# Patient Record
Sex: FEMALE | Race: WHITE | NOT HISPANIC OR LATINO | URBAN - METROPOLITAN AREA
[De-identification: names, ages, dates, MRNs, and addresses within clinical notes are randomized per-mention and may not be internally consistent; named-entity substitution may affect disease eponyms.]

---

## 2017-07-12 ENCOUNTER — EMERGENCY (EMERGENCY)
Facility: HOSPITAL | Age: 52
LOS: 1 days | Discharge: HOME | End: 2017-07-12
Attending: EMERGENCY MEDICINE | Admitting: EMERGENCY MEDICINE
Payer: MEDICAID

## 2017-07-12 VITALS
SYSTOLIC BLOOD PRESSURE: 161 MMHG | TEMPERATURE: 98 F | RESPIRATION RATE: 18 BRPM | OXYGEN SATURATION: 100 % | DIASTOLIC BLOOD PRESSURE: 104 MMHG | HEART RATE: 110 BPM

## 2017-07-12 VITALS
RESPIRATION RATE: 16 BRPM | DIASTOLIC BLOOD PRESSURE: 90 MMHG | SYSTOLIC BLOOD PRESSURE: 143 MMHG | HEART RATE: 68 BPM | OXYGEN SATURATION: 99 %

## 2017-07-12 LAB
ALBUMIN SERPL ELPH-MCNC: 4.4 G/DL — SIGNIFICANT CHANGE UP (ref 3.3–5)
ALP SERPL-CCNC: 89 U/L — SIGNIFICANT CHANGE UP (ref 40–120)
ALT FLD-CCNC: 22 U/L — SIGNIFICANT CHANGE UP (ref 4–33)
AST SERPL-CCNC: 21 U/L — SIGNIFICANT CHANGE UP (ref 4–32)
BASOPHILS # BLD AUTO: 0.02 K/UL — SIGNIFICANT CHANGE UP (ref 0–0.2)
BASOPHILS NFR BLD AUTO: 0.2 % — SIGNIFICANT CHANGE UP (ref 0–2)
BILIRUB SERPL-MCNC: 0.3 MG/DL — SIGNIFICANT CHANGE UP (ref 0.2–1.2)
BUN SERPL-MCNC: 8 MG/DL — SIGNIFICANT CHANGE UP (ref 7–23)
CALCIUM SERPL-MCNC: 9.2 MG/DL — SIGNIFICANT CHANGE UP (ref 8.4–10.5)
CHLORIDE SERPL-SCNC: 100 MMOL/L — SIGNIFICANT CHANGE UP (ref 98–107)
CO2 SERPL-SCNC: 25 MMOL/L — SIGNIFICANT CHANGE UP (ref 22–31)
CREAT SERPL-MCNC: 0.91 MG/DL — SIGNIFICANT CHANGE UP (ref 0.5–1.3)
EOSINOPHIL # BLD AUTO: 0.09 K/UL — SIGNIFICANT CHANGE UP (ref 0–0.5)
EOSINOPHIL NFR BLD AUTO: 0.8 % — SIGNIFICANT CHANGE UP (ref 0–6)
GLUCOSE SERPL-MCNC: 103 MG/DL — HIGH (ref 70–99)
HCT VFR BLD CALC: 34.9 % — SIGNIFICANT CHANGE UP (ref 34.5–45)
HGB BLD-MCNC: 10.8 G/DL — LOW (ref 11.5–15.5)
IMM GRANULOCYTES # BLD AUTO: 0.06 # — SIGNIFICANT CHANGE UP
IMM GRANULOCYTES NFR BLD AUTO: 0.5 % — SIGNIFICANT CHANGE UP (ref 0–1.5)
LYMPHOCYTES # BLD AUTO: 1.18 K/UL — SIGNIFICANT CHANGE UP (ref 1–3.3)
LYMPHOCYTES # BLD AUTO: 10.8 % — LOW (ref 13–44)
MCHC RBC-ENTMCNC: 25 PG — LOW (ref 27–34)
MCHC RBC-ENTMCNC: 30.9 % — LOW (ref 32–36)
MCV RBC AUTO: 80.8 FL — SIGNIFICANT CHANGE UP (ref 80–100)
MONOCYTES # BLD AUTO: 0.76 K/UL — SIGNIFICANT CHANGE UP (ref 0–0.9)
MONOCYTES NFR BLD AUTO: 6.9 % — SIGNIFICANT CHANGE UP (ref 2–14)
NEUTROPHILS # BLD AUTO: 8.84 K/UL — HIGH (ref 1.8–7.4)
NEUTROPHILS NFR BLD AUTO: 80.8 % — HIGH (ref 43–77)
NRBC # FLD: 0 — SIGNIFICANT CHANGE UP
PLATELET # BLD AUTO: 511 K/UL — HIGH (ref 150–400)
PMV BLD: 10.3 FL — SIGNIFICANT CHANGE UP (ref 7–13)
POTASSIUM SERPL-MCNC: 3.6 MMOL/L — SIGNIFICANT CHANGE UP (ref 3.5–5.3)
POTASSIUM SERPL-SCNC: 3.6 MMOL/L — SIGNIFICANT CHANGE UP (ref 3.5–5.3)
PROT SERPL-MCNC: 8.2 G/DL — SIGNIFICANT CHANGE UP (ref 6–8.3)
RBC # BLD: 4.32 M/UL — SIGNIFICANT CHANGE UP (ref 3.8–5.2)
RBC # FLD: 15.9 % — HIGH (ref 10.3–14.5)
SODIUM SERPL-SCNC: 141 MMOL/L — SIGNIFICANT CHANGE UP (ref 135–145)
WBC # BLD: 10.95 K/UL — HIGH (ref 3.8–10.5)
WBC # FLD AUTO: 10.95 K/UL — HIGH (ref 3.8–10.5)

## 2017-07-12 PROCEDURE — 70450 CT HEAD/BRAIN W/O DYE: CPT | Mod: 26

## 2017-07-12 PROCEDURE — 99284 EMERGENCY DEPT VISIT MOD MDM: CPT

## 2017-07-12 NOTE — ED PROVIDER NOTE - OBJECTIVE STATEMENT
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 Pain typically occurs when pt lies down and turns head to side and lasts ~2hrs, resolves with tylenol. 8/10 severity, sharp and burning quality. Pt has never had a similar headache before. Sometimes pain is a/w nausea and dizziness. Today it is not associated with these symptoms. Pt currently sitting up and asymptomatic. No focal motor or sensory deficits. No fever. 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 Pain typically occurs when pt lies down and turns head to side and lasts ~2hrs, resolves with tylenol. 8/10 severity, sharp and burning quality. Pt has never had a similar headache before. Sometimes pain is a/w nausea and dizziness. Today it is not associated with these symptoms. Pt currently sitting up and asymptomatic. No focal motor or sensory deficits. No fever.    Attendinyo female presents with headache intermittently for 3 days.  States it was a sharp pain on the side of the head into the eye.  Pt has an AVM which was being monitored by neurology but she has not followed up with her neurologist in years.  No fever. no neck stiffness.

## 2017-07-12 NOTE — ED ADULT TRIAGE NOTE - CHIEF COMPLAINT QUOTE
Pt c/o HA & dizziness x 3 days accompanied by nausea. Hx: Migraines, AVM of L side. Denies vision changes, vomiting, fever/chills, palpitations, CP.

## 2017-07-12 NOTE — ED PROVIDER NOTE - MEDICAL DECISION MAKING DETAILS
ha, intermittent, no neuro findings. However pt lost to followup and has hx AVM. Will obtain CTH to r/o bleed and give pt neuro f/up.

## 2017-07-12 NOTE — ED PROVIDER NOTE - NEUROLOGICAL, MLM
Alert and oriented, no focal deficits, no motor or sensory deficits. CN 2-12 intact. no nystagmus or double vision, finger to nose intact

## 2017-07-12 NOTE — ED PROVIDER NOTE - CARE PLAN
Principal Discharge DX:	Headache  Instructions for follow-up, activity and diet:	The patient was given verbal and written discharge instructions. Specifically, instructions when to return to the ED and when to seek follow-up from their pcp was discussed. Any specialty follow-up was discussed, including how to make an appointment.  Instructions were discussed in simple, plain language and was understood by the patient. The patient understands that should their symptoms worsen or any new symptoms arise, they should return to the ED immediately for further evaluation.

## 2017-07-12 NOTE — ED ADULT NURSE NOTE - OBJECTIVE STATEMENT
pt on bed aox4 ambulatory c.o left sided headache(frontal/parietal/temporal area) for 3 days continous, with dizziness, nausea(room is like spinning)reports sometimes relieve with Tylenol,took it prior coming here that's why his Ha is not bad denies photosensitivity, vomiting CP fever chills palpitation weakness numbness tingling sensation on 4 extremity. 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

## 2020-06-02 ENCOUNTER — APPOINTMENT (OUTPATIENT)
Dept: DISASTER EMERGENCY | Facility: CLINIC | Age: 55
End: 2020-06-02

## 2020-06-02 DIAGNOSIS — Z01.818 ENCOUNTER FOR OTHER PREPROCEDURAL EXAMINATION: ICD-10-CM

## 2020-06-02 PROBLEM — Z00.00 ENCOUNTER FOR PREVENTIVE HEALTH EXAMINATION: Status: ACTIVE | Noted: 2020-06-02

## 2020-06-03 ENCOUNTER — TRANSCRIPTION ENCOUNTER (OUTPATIENT)
Age: 55
End: 2020-06-03

## 2020-06-03 LAB — SARS-COV-2 N GENE NPH QL NAA+PROBE: NOT DETECTED

## 2020-12-10 ENCOUNTER — APPOINTMENT (OUTPATIENT)
Dept: PHYSICAL MEDICINE AND REHAB | Facility: CLINIC | Age: 55
End: 2020-12-10
Payer: MEDICAID

## 2020-12-10 VITALS — DIASTOLIC BLOOD PRESSURE: 82 MMHG | SYSTOLIC BLOOD PRESSURE: 118 MMHG | HEART RATE: 90 BPM

## 2020-12-10 DIAGNOSIS — M77.30 CALCANEAL SPUR, UNSPECIFIED FOOT: ICD-10-CM

## 2020-12-10 DIAGNOSIS — Z85.43 PERSONAL HISTORY OF MALIGNANT NEOPLASM OF OVARY: ICD-10-CM

## 2020-12-10 PROBLEM — Z00.00 ENCOUNTER FOR PREVENTIVE HEALTH EXAMINATION: Noted: 2020-12-10

## 2020-12-10 PROCEDURE — 99204 OFFICE O/P NEW MOD 45 MIN: CPT

## 2020-12-10 PROCEDURE — 99072 ADDL SUPL MATRL&STAF TM PHE: CPT

## 2020-12-10 RX ORDER — AMLODIPINE BESYLATE 5 MG/1
TABLET ORAL
Refills: 0 | Status: ACTIVE | COMMUNITY

## 2020-12-10 RX ORDER — RIVAROXABAN 2.5 MG/1
TABLET, FILM COATED ORAL
Refills: 0 | Status: ACTIVE | COMMUNITY

## 2020-12-10 RX ORDER — DICLOFENAC SODIUM 1 %
1 KIT TOPICAL DAILY
Qty: 1 | Refills: 2 | Status: ACTIVE | COMMUNITY
Start: 2020-12-10 | End: 1900-01-01

## 2020-12-10 NOTE — DATA REVIEWED
[FreeTextEntry1] : CT chest abdomen pelvis November 2020: Mildly enlarged right pelvic lymph nodes.  Postoperative changes.

## 2020-12-10 NOTE — ASSESSMENT
[FreeTextEntry1] : 55-year-old female with ovarian cancer presenting for initial evaluation of heel pain.\par \par #Heel pain/foot pain\par -She is demonstrating some focal tenderness especially in the right heel.\par -Obtain right foot x-ray to determine if heel spur or other osseous abnormalities in the region.\par -Start Voltaren gel for treatment of nociceptive component to pain.\par -She is also expressing symptoms consistent with neuropathic pain and myalgias which may be consistent with Abraxane treatment.\par -Start gabapentin 300 mg 3 times a day for neuropathic pain and myalgias.\par -Laboratory values reviewed.  \par -Can consider therapeutic modalities in the future if no improvement.\par \par Follow-up in 1 month.

## 2020-12-10 NOTE — PHYSICAL EXAM
[FreeTextEntry1] : Gen: Patient is A&O x 3, NAD\par HEENT: EOMI, hearing grossly normal\par Resp: regular, non - labored\par CV: pulses regular\par Skin: no rashes, erythema\par Lymph: no clubbing, cyanosis, edema, or palpable lymphadenopathy\par Inspection: no instability or misalignment\par ROM: full throughout, some calf tightness with ankle dorisflexion \par Palpation:  TTP bilateral calcaneous R>L\par Sensation: intact to light touch\par Reflexes: 1+ and symmetric throughout\par Strength: 5/5 throughout\par Special tests: -Straight leg raise\par Gait: normal, non-antalgic\par \par

## 2020-12-10 NOTE — HISTORY OF PRESENT ILLNESS
[FreeTextEntry1] : Ms. 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 \par \par She reports she has bilateral heel pain right greater than left.  She notes the pain is worse at night when she is at rest.  She describes a strong sensation of burning symptoms in sensation.  Pain is worse at the proximal heel insertion.  She takes Tylenol which helps.  She has tried rolling the area but this does not help.  She denies is worse with walking.  Rubbing her feet together at night can sometimes help.  She denies any new weakness.  She denies any recent falls or difficulty with balance.

## 2021-02-11 ENCOUNTER — APPOINTMENT (OUTPATIENT)
Dept: PHYSICAL MEDICINE AND REHAB | Facility: CLINIC | Age: 56
End: 2021-02-11
Payer: MEDICAID

## 2021-02-11 VITALS
OXYGEN SATURATION: 98 % | HEART RATE: 56 BPM | TEMPERATURE: 97.5 F | DIASTOLIC BLOOD PRESSURE: 75 MMHG | SYSTOLIC BLOOD PRESSURE: 103 MMHG | WEIGHT: 197 LBS

## 2021-02-11 DIAGNOSIS — M79.2 NEURALGIA AND NEURITIS, UNSPECIFIED: ICD-10-CM

## 2021-02-11 DIAGNOSIS — M79.673 PAIN IN UNSPECIFIED FOOT: ICD-10-CM

## 2021-02-11 PROCEDURE — 99214 OFFICE O/P EST MOD 30 MIN: CPT

## 2021-02-11 PROCEDURE — 99072 ADDL SUPL MATRL&STAF TM PHE: CPT

## 2021-02-11 NOTE — ASSESSMENT
[FreeTextEntry1] : 55-year-old female with ovarian cancer presenting for initial evaluation of heel pain.\par \par #Heel pain/foot pain\par -She is demonstrating some focal tenderness especially in the right heel.\par -She did not obtain x-ray yet, she plans to perform this soon to evaluate the heel.\par -Continue Voltaren gel for treatment of nociceptive component to pain.\par -Laboratory values reviewed.  Case discussed with palliative care.  \par -Can consider therapeutic modalities in the future if no improvement.\par \par #Neuropathy:\par -Previous intolerance to cymbalta.\par -Patient educated on gabapentin use, she would like to start 300mg at night.  Discussed we will continue to titrate.  \par \par \par Follow-up in 1 month. \par

## 2021-02-11 NOTE — PHYSICAL EXAM
[FreeTextEntry1] : Gen: Patient is A&O x 3, NAD\par HEENT: EOMI, hearing grossly normal\par Resp: regular, non - labored\par CV: pulses regular\par Skin: no rashes, erythema\par Lymph: no clubbing, cyanosis, edema, or palpable lymphadenopathy\par Inspection: no instability or misalignment\par ROM: full throughout, some calf tightness with ankle dorisflexion \par Palpation: TTP Right calcaneus\par Sensation: intact to light touch\par Reflexes: 1+ and symmetric throughout\par Strength: 5/5 throughout\par Special tests: -Straight leg raise\par Gait: normal, non-antalgic\par

## 2021-02-11 NOTE — HISTORY OF PRESENT ILLNESS
[FreeTextEntry1] : Ms. 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 \par \par Since her last visit she reports that pains have mildly improved.  She has not been taking gabapentin regularly.  When she does take it she thinks it may be helpful.  She previously did not tolerate Cymbalta.  She also continues to have pain in her right posterior heel.  Pain is worse with ambulation.  She denies any difficulties with ADLs or mobility.

## 2022-09-21 ENCOUNTER — OUTPATIENT (OUTPATIENT)
Dept: OUTPATIENT SERVICES | Facility: HOSPITAL | Age: 57
LOS: 1 days | Discharge: HOME | End: 2022-09-21

## 2022-09-21 DIAGNOSIS — C56.9 MALIGNANT NEOPLASM OF UNSPECIFIED OVARY: ICD-10-CM

## 2022-09-27 ENCOUNTER — APPOINTMENT (OUTPATIENT)
Dept: HEMATOLOGY ONCOLOGY | Facility: CLINIC | Age: 57
End: 2022-09-27

## 2022-09-27 ENCOUNTER — NON-APPOINTMENT (OUTPATIENT)
Age: 57
End: 2022-09-27

## 2022-09-27 VITALS
HEART RATE: 100 BPM | DIASTOLIC BLOOD PRESSURE: 93 MMHG | OXYGEN SATURATION: 98 % | WEIGHT: 188.47 LBS | TEMPERATURE: 97.4 F | SYSTOLIC BLOOD PRESSURE: 137 MMHG | RESPIRATION RATE: 16 BRPM | HEIGHT: 66.54 IN | BODY MASS INDEX: 29.93 KG/M2

## 2022-09-27 DIAGNOSIS — Z80.42 FAMILY HISTORY OF MALIGNANT NEOPLASM OF PROSTATE: ICD-10-CM

## 2022-09-27 DIAGNOSIS — Z80.3 FAMILY HISTORY OF MALIGNANT NEOPLASM OF BREAST: ICD-10-CM

## 2022-09-27 PROCEDURE — 99205 OFFICE O/P NEW HI 60 MIN: CPT

## 2022-09-27 RX ORDER — PACLITAXEL 100 MG/20ML
INJECTION, POWDER, LYOPHILIZED, FOR SUSPENSION INTRAVENOUS
Refills: 0 | Status: DISCONTINUED | COMMUNITY
End: 2022-09-27

## 2022-09-29 NOTE — REVIEW OF SYSTEMS
[Negative] : Allergic/Immunologic [Fatigue] : fatigue [Recent Change In Weight] : ~T recent weight change [Muscle Pain] : muscle pain [Fever] : no fever [Dizziness] : no dizziness [Difficulty Walking] : no difficulty walking [de-identified] : headache

## 2022-09-29 NOTE — HISTORY OF PRESENT ILLNESS
[Disease: _____________________] : Disease: [unfilled] [T: ___] : T[unfilled] [N: ___] : N[unfilled] [M: ___] : M[unfilled] [AJCC Stage: ____] : AJCC Stage: [unfilled] [de-identified] : 58 yo F with a PMH of HTN, fatty liver disease with gallstones, PE (2018), ventral hernia (not repaired), and who presents for management of high grade serous ovarian carcinoma.\par \par She initially had abdominal pain in late 2017 with elevated CA-125, found to have b/l adnexal masses; did not have gross metastatic disease at that time. She had a SUAD-BSO, omentectomy, appendectomy, and tumor debulking 1/29/18, and was found to have high grade serous ovarian carcinoma (TMB 4 Muts/Mb, no targetable mutations, BRCA negative, Ki67 > 90%), PDL1 < 1%, [id number] disease; underwent 6 cycles of Carbo/Taxol 03-06/2018 (done at [facility])\par Found to have worsening abdominal and particularly R pelvic LAD on 11/2018. She was then seen by Dr. Maher. Started Doxil/Avastin, 4 cycles 12/2018 – 03/2019\par Topotecan/Avastin, 03/2019 – 08/2019, followed by pelvic LN IMRT/IGRT x 5 fx\par Abraxane 01/2020, followed by 5 fx of RT (06/2020), improved periportal, mesenteric, paracolic, and near resolution of L external iliac LAD, with improved R external iliac LAD. Restarted Abraxane 06/2020.\par Increase in L external iliac LN 05/2021, agreed to radiate 5 fx every other day and hold off change in therapy, c/b diarrhea. Resumed Abraxane after (c/b neuropathy)\par Interval progression of disease in November 2021 with R adrenal gland nodule and increasing L external iliac and mesenteric LAD. Started on Niraparib 12/9/21.\par CT 5/2022 showed an increase in the size of her R adrenal LN and mesenteric LAD with new R retrocaval LAD. S/p adrenal bx 5/25/22 confirming metastatic ovarian carcinoma, Ki67 90%. Foundation showed MSI-S disease, TMB 4 Muts/Mb, ALANA 13.5%, AKT2 amplification, CCNE1 amplification, PIK3CB amplification, TP53 C277*, CIC rearrangement exon 15, MYCL1 amplification, and NFKBIA amplification but no targetable mutations. In 06/2022, she started single agent Gemcitabine on days 1, 8 of 21 day cycle.\par CT 09/2022 showed overall POD with enlarging R adrenal nodule and RP LAD with slightly decreased mesenteric LAD.\par She was planned to start Alimta, but referred to Dr. Au to evaluate for possible clinical trial.\par \par She lost about 15 pounds since the Gemcitabine treatment but has gained 4 of it back. Prior to Gemcitabine, her weight was stable.\par Currently c/o constipation, no blood in stool. No N/V, CP, cough.\par She thinks she had an allergic reaction (dyspnea, flushing) to her 3rd chemotherapy, but was improved after infusing slowly. No reaction to Carbo/Taxol.\par She had myalgias constantly since starting chemotherapy, but improved with Tylenol. Also has a moderate headache (without blurry vision) that improves \par She had a PE when originally diagnosed in 2018, as well as in 2022 in the pelvis.\par \par Family Hx of prostate cancer (father, age 64) and maternal aunt (breast cancer, young age).\par No smoking, alcohol, or drug use history. Lives with her  and adult children. Able to function but usually has to stop housework after about 20-30 minutes, takes 5 minute break, and then restart. She does not go outside much because she has pain from her ventral hernia (she has not had it repaired due to cancer). [de-identified] : High Grade Serous Ovarian Carcinoma [de-identified] : Patient feels well, has some fatigue otherwise is asymptomatic.

## 2022-09-29 NOTE — ASSESSMENT
[FreeTextEntry1] : 58 yo F with a PMH of HTN, fatty liver disease with gallstones, PE (2018), ventral hernia (not repaired), and who presents for management of high grade serous ovarian carcinoma. Follows with Dr. Maher at [facility].\par - Diagnosed originally 01/2018, s/p SUAD-BSO showing stage Toshia disease\par - No targetable mutations, PD-L1 < 1%, BRCA negative\par - S/p multiple treatments over past 4.5 years\par - Not a candidate for clinical trials at [facility] because patient has received 5 lines of treatment ([facility] trial only available for pts who had 4 lines of treatment). Potentially could be in [place] or [place] Chemo for AKT2 mutation\par - Would recommend re-introducing single agent Carboplatin (has not received since 2018) Q3 weeks with steroids\par - Alternative option would be to start Alimta with B12\par - Adverse effects of both options discussed in detail with patient and her \par - Will discuss with Dr. Maher; patient to continue following with him.\par \par \par Patient was seen with and case discussed with Dr. Au.\par \par Aryles Hedjar, MD, PGY-5\par Hematology/Oncology Fellow\par [facility] [Palliative] : Goals of care discussed with patient: Palliative [Palliative Care Plan] : not applicable at this time

## 2022-09-29 NOTE — REASON FOR VISIT
[Initial Consultation] : an initial consultation [Spouse] : spouse [FreeTextEntry2] : ovarian cancer.

## 2022-09-30 ENCOUNTER — RESULT REVIEW (OUTPATIENT)
Age: 57
End: 2022-09-30

## 2022-09-30 PROCEDURE — 88321 CONSLTJ&REPRT SLD PREP ELSWR: CPT

## 2022-10-03 LAB — SURGICAL PATHOLOGY STUDY: SIGNIFICANT CHANGE UP

## 2023-01-11 ENCOUNTER — APPOINTMENT (OUTPATIENT)
Dept: SURGICAL ONCOLOGY | Facility: CLINIC | Age: 58
End: 2023-01-11
Payer: MEDICAID

## 2023-01-11 VITALS
TEMPERATURE: 98.6 F | DIASTOLIC BLOOD PRESSURE: 91 MMHG | SYSTOLIC BLOOD PRESSURE: 131 MMHG | RESPIRATION RATE: 16 BRPM | HEART RATE: 108 BPM | BODY MASS INDEX: 29.32 KG/M2 | WEIGHT: 176 LBS | OXYGEN SATURATION: 99 % | HEIGHT: 65 IN

## 2023-01-11 PROCEDURE — 99243 OFF/OP CNSLTJ NEW/EST LOW 30: CPT

## 2023-01-18 ENCOUNTER — APPOINTMENT (OUTPATIENT)
Dept: RADIATION ONCOLOGY | Facility: CLINIC | Age: 58
End: 2023-01-18

## 2023-01-18 DIAGNOSIS — Z78.9 OTHER SPECIFIED HEALTH STATUS: ICD-10-CM

## 2023-01-18 NOTE — REASON FOR VISIT
[Home] : at home, [unfilled] , at the time of the visit. [Medical Office: ([facility])___] : at the medical office located in  [Verbal consent obtained from patient] : the patient, [unfilled] [Consideration for Non-Curative Therapy] : consideration for non-curative therapy for

## 2023-01-22 NOTE — REVIEW OF SYSTEMS
[Negative] : Allergic/Immunologic [FreeTextEntry5] : HTN [FreeTextEntry8] : h/o chemo and radiation for ovarian caner

## 2023-01-22 NOTE — HISTORY OF PRESENT ILLNESS
[FreeTextEntry1] : Delia Aden presents for telehealth visit to discuss RT options for metastatic serous cell ovarian carcinoma.\par \par Oncologic history:\par \par She initially had abdominal pain in late 2017 with elevated CA-125, found to have b/l adnexal masses; did not have gross metastatic disease at that time. She had a SUAD-BSO, omentectomy, appendectomy, and tumor debulking 1/29/18, and was found to have high grade serous ovarian carcinoma (TMB 4 Muts/Mb, no targetable mutations, BRCA negative, Ki67 > 90%), PDL1 < 1%, [id number] disease; underwent 6 cycles of Carbo/Taxol 03-06/2018 (done at [facility])\par \par Found to have worsening abdominal and particularly R pelvic LAD on 11/2018. She was then seen by Dr. Maher. Started Doxil/Avastin, 4 cycles 12/2018 – 03/2019 Topotecan/Avastin, 03/2019 – 08/2019, followed by pelvic LN IMRT/IGRT 30gy/5fx with Dr. Romano at [facility]\par \par Abraxane 01/2020, followed by 30gy/5 fx of RT (06/2020), improved periportal, mesenteric, paracolic, and near resolution of L external iliac LAD, with improved R external iliac LAD. Restarted Abraxane 06/2020.\par \par Increase in L external iliac LN 05/2021, agreed to radiate 5 fx every other day and hold off change in therapy, c/b diarrhea. Resumed Abraxane after (c/b neuropathy)\par \par Interval progression of disease in November 2021 with R adrenal gland nodule and increasing L external iliac and mesenteric LAD. Started on Niraparib 12/9/21.\par CT 5/2022 showed an increase in the size of her R adrenal LN and mesenteric LAD with new R retrocaval LAD. S/p adrenal bx 5/25/22 confirming metastatic ovarian carcinoma, Ki67 90%. Foundation showed MSI-S disease, TMB 4 Muts/Mb, ALANA 13.5%, AKT2 amplification, CCNE1 amplification, PIK3CB amplification, TP53 C277*, CIC rearrangement exon 15, MYCL1 amplification, and NFKBIA amplification but no targetable mutations. In 06/2022, she started single agent Gemcitabine on days 1, 8 of 21 day cycle.\par CT 09/2022 showed overall POD with enlarging R adrenal nodule and RP LAD with slightly decreased mesenteric LAD.\par She was planned to start Alimta, but referred to Dr. Au to evaluate for possible clinical trial.\par \par 1/11/23: Surg onc consult with Dr. Shivam Rivas, for removal of progressing adnexal mass and RP adenopathy- not recommended at this time per note\par \par

## 2023-01-22 NOTE — END OF VISIT
[] : Resident [FreeTextEntry3] : Agree with above. Side effects explained in detail and include but not limited to necrosis, bleeding, perforation, fatigue, skin toxicity. [Time Spent: ___ minutes] : I have spent [unfilled] minutes of time on the encounter. [>50% of the face to face encounter time was spent on counseling and/or coordination of care for ___] : Greater than 50% of the face to face encounter time was spent on counseling and/or coordination of care for [unfilled]

## 2023-01-24 ENCOUNTER — FORM ENCOUNTER (OUTPATIENT)
Age: 58
End: 2023-01-24

## 2023-01-27 ENCOUNTER — OUTPATIENT (OUTPATIENT)
Dept: OUTPATIENT SERVICES | Facility: HOSPITAL | Age: 58
LOS: 1 days | Discharge: HOME | End: 2023-01-27
Payer: MEDICAID

## 2023-01-27 PROCEDURE — 77334 RADIATION TREATMENT AID(S): CPT | Mod: 26

## 2023-01-27 PROCEDURE — 77290 THER RAD SIMULAJ FIELD CPLX: CPT | Mod: 26

## 2023-01-30 DIAGNOSIS — C79.71 SECONDARY MALIGNANT NEOPLASM OF RIGHT ADRENAL GLAND: ICD-10-CM

## 2023-02-14 NOTE — ASSESSMENT
[FreeTextEntry1] : IMP: 58 year old female present with history of ovarian cancer with metastatic disease to adrenal gland\par \par \par CT chest abd/pelvis 12/29/22: Overall interval progression of metastatic disease with continued interval increase in size of right adrenal metastasis and multiple retroperitoneal nodes. No evidence of metastatic disease in the chest . Stable multiples ventral abdominal wall hernias, some of which contain non obstructed bowel loops.\par \par PLAN: \par Due to the multiple ventral abdominal wall hernia,the yield by adrenalectomy would be limited as the hernia repair will likely need component separation given the loss of domain. PT not willing to consider a major surgery\par Patient advise to Return to care with Dr. Maher, and possible discussion of radiation.

## 2023-02-14 NOTE — CONSULT LETTER
[Consult Letter:] : I had the pleasure of evaluating your patient, [unfilled]. [( Thank you for referring [unfilled] for consultation for _____ )] : Thank you for referring [unfilled] for consultation for [unfilled] [Please see my note below.] : Please see my note below. [Consult Closing:] : Thank you very much for allowing me to participate in the care of this patient.  If you have any questions, please do not hesitate to contact me. [Sincerely,] : Sincerely, [FreeTextEntry2] : Dr. Maher  [Dear  ___] : Dear  [unfilled], [FreeTextEntry3] : Marlo Langley MD, FICS, FACS\par Director of Surgical Oncology- [facility] \par , Department of Surgery  \par [facility] [employer] [facility] [employer] [facility] \par [address]\[place] [address]\par \par [address]\[place] [address]\par \par [address]\[place] [address]\par \par (mob) [phone]\par (o) [phone]\par (f) [phone]\par

## 2023-02-14 NOTE — HISTORY OF PRESENT ILLNESS
[de-identified] : Ms. LYNSEY SINGLETON is a 58 year old female who present today for initial consultation. Referred by Dr. Maher \par Patient with history of ovarian cancer with metastatic disease to the adrenal. \par \par CT chest abd/pelvis 12/29/22: Overall interval progression of metastatic disease with continued interval increase in size of right adrenal metastasis and multiple retroperitoneal nodes. No evidence of metastatic disease in the chest . Stable multiples ventral abdominal wall hernias, some of which contain non obstructed bowel loops. \par \par Patient present today to discuss for possible removal of adrenal mass for pain control. History of multiples ventral abdominal wall hernia. \par

## 2023-02-14 NOTE — PHYSICAL EXAM
[Normal] : supple, no neck mass and thyroid not enlarged [Normal Neck Lymph Nodes] : normal neck lymph nodes  [Normal Supraclavicular Lymph Nodes] : normal supraclavicular lymph nodes [Normal Groin Lymph Nodes] : normal groin lymph nodes [Normal Axillary Lymph Nodes] : normal axillary lymph nodes [Normal] : oriented to person, place and time, with appropriate affect [FreeTextEntry1] : COVID-19 precautions as per [facility] policy was universally followed\par  [de-identified] : patient with history of abdominal walll hernia

## 2023-02-16 ENCOUNTER — NON-APPOINTMENT (OUTPATIENT)
Age: 58
End: 2023-02-16

## 2023-02-21 NOTE — HISTORY OF PRESENT ILLNESS
[FreeTextEntry1] : Delia Aden presents for telehealth visit to discuss RT options for metastatic serous cell ovarian carcinoma.\par \par Oncologic history:\par \par She initially had abdominal pain in late 2017 with elevated CA-125, found to have b/l adnexal masses; did not have gross metastatic disease at that time. She had a SUAD-BSO, omentectomy, appendectomy, and tumor debulking 1/29/18, and was found to have high grade serous ovarian carcinoma (TMB 4 Muts/Mb, no targetable mutations, BRCA negative, Ki67 > 90%), PDL1 < 1%, [id number] disease; underwent 6 cycles of Carbo/Taxol 03-06/2018 (done at [facility])\par \par Found to have worsening abdominal and particularly R pelvic LAD on 11/2018. She was then seen by Dr. Maher. Started Doxil/Avastin, 4 cycles 12/2018 – 03/2019 Topotecan/Avastin, 03/2019 – 08/2019, followed by pelvic LN IMRT/IGRT 30gy/5fx with Dr. Romano at [facility]\par \par Abraxane 01/2020, followed by 30gy/5 fx of RT (06/2020), improved periportal, mesenteric, paracolic, and near resolution of L external iliac LAD, with improved R external iliac LAD. Restarted Abraxane 06/2020.\par \par Increase in L external iliac LN 05/2021, agreed to radiate 5 fx every other day and hold off change in therapy, c/b diarrhea. Resumed Abraxane after (c/b neuropathy)\par \par Interval progression of disease in November 2021 with R adrenal gland nodule and increasing L external iliac and mesenteric LAD. Started on Niraparib 12/9/21.\par CT 5/2022 showed an increase in the size of her R adrenal LN and mesenteric LAD with new R retrocaval LAD. S/p adrenal bx 5/25/22 confirming metastatic ovarian carcinoma, Ki67 90%. Foundation showed MSI-S disease, TMB 4 Muts/Mb, ALANA 13.5%, AKT2 amplification, CCNE1 amplification, PIK3CB amplification, TP53 C277*, CIC rearrangement exon 15, MYCL1 amplification, and NFKBIA amplification but no targetable mutations. In 06/2022, she started single agent Gemcitabine on days 1, 8 of 21 day cycle.\par CT 09/2022 showed overall POD with enlarging R adrenal nodule and RP LAD with slightly decreased mesenteric LAD.\par She was planned to start Alimta, but referred to Dr. Au to evaluate for possible clinical trial.\par \par 1/11/23: Surg onc consult with Dr. Shivam Rivas, for removal of progressing adnexal mass and RP adenopathy- not recommended at this time per note\par \par 2/16/2023 OTV. 1/5 fractions of SBRT to right adrenal gland completed. c/o pain over right lateral abd, constipation, nausea at times.

## 2023-02-21 NOTE — REVIEW OF SYSTEMS
[Constipation: Grade 1 - Occasional or intermittent symptoms; occasional use of stool softeners, laxatives, dietary modification, or enema] : Constipation: Grade 1 - Occasional or intermittent symptoms; occasional use of stool softeners, laxatives, dietary modification, or enema [Nausea: Grade 1 - Loss of appetite without alteration in eating habits] : Nausea: Grade 1 - Loss of appetite without alteration in eating habits [Hematuria: Grade 0] : Hematuria: Grade 0 [Urinary Incontinence: Grade 0] : Urinary Incontinence: Grade 0  [Urinary Retention: Grade 0] : Urinary Retention: Grade 0 [Urinary Tract Pain: Grade 0] : Urinary Tract Pain: Grade 0 [Urinary Urgency: Grade 0] : Urinary Urgency: Grade 0 [Urinary Frequency: Grade 0] : Urinary Frequency: Grade 0 [Negative] : Allergic/Immunologic [FreeTextEntry5] : HTN [FreeTextEntry8] : h/o chemo and radiation for ovarian caner

## 2023-02-21 NOTE — DISEASE MANAGEMENT
[Clinical] : TNM Stage: c [IV] : IV [TTNM] : X [NTNM] : X [MTNM] : 1 [de-identified] : 30 Gy [de-identified] : right adrenal gland

## 2023-02-23 ENCOUNTER — NON-APPOINTMENT (OUTPATIENT)
Age: 58
End: 2023-02-23

## 2023-02-26 NOTE — HISTORY OF PRESENT ILLNESS
[FreeTextEntry1] : 59 y/o female presents for telehealth visit to discuss RT options for metastatic serous cell ovarian carcinoma.\par \par Oncologic history:\par \par She initially had abdominal pain in late 2017 with elevated CA-125, found to have b/l adnexal masses; did not have gross metastatic disease at that time. She had a SUAD-BSO, omentectomy, appendectomy, and tumor debulking 1/29/18, and was found to have high grade serous ovarian carcinoma (TMB 4 Muts/Mb, no targetable mutations, BRCA negative, Ki67 > 90%), PDL1 < 1%, [id number] disease; underwent 6 cycles of Carbo/Taxol 03-06/2018 (done at [facility])\par \par Found to have worsening abdominal and particularly R pelvic LAD on 11/2018. She was then seen by Dr. Maher. Started Doxil/Avastin, 4 cycles 12/2018 – 03/2019 Topotecan/Avastin, 03/2019 – 08/2019, followed by pelvic LN IMRT/IGRT 30gy/5fx with Dr. Romano at [facility]\par \par Abraxane 01/2020, followed by 30gy/5 fx of RT (06/2020), improved periportal, mesenteric, paracolic, and near resolution of L external iliac LAD, with improved R external iliac LAD. Restarted Abraxane 06/2020.\par \par Increase in L external iliac LN 05/2021, agreed to radiate 5 fx every other day and hold off change in therapy, c/b diarrhea. Resumed Abraxane after (c/b neuropathy)\par \par Interval progression of disease in November 2021 with R adrenal gland nodule and increasing L external iliac and mesenteric LAD. Started on Niraparib 12/9/21.\par CT 5/2022 showed an increase in the size of her R adrenal LN and mesenteric LAD with new R retrocaval LAD. S/p adrenal bx 5/25/22 confirming metastatic ovarian carcinoma, Ki67 90%. Foundation showed MSI-S disease, TMB 4 Muts/Mb, ALANA 13.5%, AKT2 amplification, CCNE1 amplification, PIK3CB amplification, TP53 C277*, CIC rearrangement exon 15, MYCL1 amplification, and NFKBIA amplification but no targetable mutations. In 06/2022, she started single agent Gemcitabine on days 1, 8 of 21 day cycle.\par CT 09/2022 showed overall POD with enlarging R adrenal nodule and RP LAD with slightly decreased mesenteric LAD.\par She was planned to start Alimta, but referred to Dr. Au to evaluate for possible clinical trial.\par \par 1/11/23: Surg onc consult with Dr. Shivam Rivas, for removal of progressing adnexal mass and RP adenopathy- not recommended at this time per note\par \par 2/23/2023 OTV. 4/5 fractions of SBRT to right adrenal gland completed. c/o pain over right lateral abd, constipation, nausea at times. Med Onc Dr Maher @ [facility].

## 2023-02-26 NOTE — DISEASE MANAGEMENT
[Clinical] : TNM Stage: c [IV] : IV [TTNM] : X [NTNM] : X [MTNM] : 1 [de-identified] : 30 Gy [de-identified] : right adrenal gland

## 2023-04-05 ENCOUNTER — APPOINTMENT (OUTPATIENT)
Dept: RADIATION ONCOLOGY | Facility: CLINIC | Age: 58
End: 2023-04-05

## 2023-04-10 ENCOUNTER — FORM ENCOUNTER (OUTPATIENT)
Age: 58
End: 2023-04-10

## 2023-04-11 ENCOUNTER — APPOINTMENT (OUTPATIENT)
Dept: RADIATION ONCOLOGY | Facility: CLINIC | Age: 58
End: 2023-04-11

## 2023-04-11 NOTE — REASON FOR VISIT
[Home] : at home, [unfilled] , at the time of the visit. [Medical Office: ([facility])___] : at the medical office located in  [Patient] : the patient [Self] : self

## 2023-04-11 NOTE — HISTORY OF PRESENT ILLNESS
[FreeTextEntry1] : 57 y/o female with h/o radiation to right adrenal mets from metastatic serous cell ovarian carcinoma in 2/2023 presents for f/u.\par \par Oncologic history:\par She initially had abdominal pain in late 2017 with elevated CA-125, found to have b/l adnexal masses; did not have gross metastatic disease at that time. She had a SUAD-BSO, omentectomy, appendectomy, and tumor debulking 1/29/18, and was found to have high grade serous ovarian carcinoma (TMB 4 Muts/Mb, no targetable mutations, BRCA negative, Ki67 > 90%), PDL1 < 1%, [id number] disease; underwent 6 cycles of Carbo/Taxol 03-06/2018 (done at [facility])\par \par Found to have worsening abdominal and particularly R pelvic LAD on 11/2018. She was then seen by Dr. Maher. Started Doxil/Avastin, 4 cycles 12/2018 – 03/2019 Topotecan/Avastin, 03/2019 – 08/2019, followed by pelvic LN IMRT/IGRT 30gy/5fx with Dr. Romano at [facility]\par \par Abraxane 01/2020, followed by 30gy/5 fx of RT (06/2020), improved periportal, mesenteric, paracolic, and near resolution of L external iliac LAD, with improved R external iliac LAD. Restarted Abraxane 06/2020.\par \par Increase in L external iliac LN 05/2021, agreed to radiate 5 fx every other day and hold off change in therapy, c/b diarrhea. Resumed Abraxane after (c/b neuropathy)\par \par Interval progression of disease in November 2021 with R adrenal gland nodule and increasing L external iliac and mesenteric LAD. Started on Niraparib 12/9/21.\par CT 5/2022 showed an increase in the size of her R adrenal LN and mesenteric LAD with new R retrocaval LAD. S/p adrenal bx 5/25/22 confirming metastatic ovarian carcinoma, Ki67 90%. Foundation showed MSI-S disease, TMB 4 Muts/Mb, ALANA 13.5%, AKT2 amplification, CCNE1 amplification, PIK3CB amplification, TP53 C277*, CIC rearrangement exon 15, MYCL1 amplification, and NFKBIA amplification but no targetable mutations. In 06/2022, she started single agent Gemcitabine on days 1, 8 of 21 day cycle.\par CT 09/2022 showed overall POD with enlarging R adrenal nodule and RP LAD with slightly decreased mesenteric LAD.\par She was planned to start Alimta, but referred to Dr. Au to evaluate for possible clinical trial.\par \par 1/11/23: Surg onc consult with Dr. Shivam Rivas, for removal of progressing adnexal mass and RP adenopathy- not recommended at this time per note\par \par 2/23/2023 OTV. 4/5 fractions of SBRT to right adrenal gland completed. c/o pain over right lateral abd, constipation, nausea at times. Med Onc Dr Maher @ [facility].\par \par 4/11/23 F/U. Pt completed 30 Gy/% Fx of SBRT to right adrenal gland in 2/2023. No abd pain, urinary bother, nausea. Plan: PET scan then f/u and radiation treatment to new lesions

## 2023-05-05 ENCOUNTER — APPOINTMENT (OUTPATIENT)
Dept: RADIATION ONCOLOGY | Facility: CLINIC | Age: 58
End: 2023-05-05
Payer: MEDICAID

## 2023-05-05 PROCEDURE — 99443: CPT

## 2023-05-05 NOTE — REASON FOR VISIT
[Post-Treatment Evaluation] : post-treatment evaluation for [Other: ___] : [unfilled] [Home] : at home, [unfilled] , at the time of the visit. [Medical Office: ([facility])___] : at the medical office located in  [Patient] : the patient [Self] : self

## 2023-05-08 NOTE — REVIEW OF SYSTEMS
[Negative] : Allergic/Immunologic [Constipation: Grade 0] : Constipation: Grade 0 [Nausea: Grade 0] : Nausea: Grade 0 [Hematuria: Grade 0] : Hematuria: Grade 0 [Urinary Incontinence: Grade 0] : Urinary Incontinence: Grade 0  [Urinary Retention: Grade 0] : Urinary Retention: Grade 0 [Urinary Tract Pain: Grade 0] : Urinary Tract Pain: Grade 0 [Urinary Urgency: Grade 0] : Urinary Urgency: Grade 0 [Urinary Frequency: Grade 0] : Urinary Frequency: Grade 0 [FreeTextEntry5] : HTN [FreeTextEntry8] : h/o chemo and radiation for ovarian caner

## 2023-05-08 NOTE — HISTORY OF PRESENT ILLNESS
[FreeTextEntry1] : 59 y/o female with h/o radiation to right adrenal mets from metastatic serous cell ovarian carcinoma in 2/2023 presents for f/u.\par \par Oncologic history:\par She initially had abdominal pain in late 2017 with elevated CA-125, found to have b/l adnexal masses; did not have gross metastatic disease at that time. She had a SUAD-BSO, omentectomy, appendectomy, and tumor debulking 1/29/18, and was found to have high grade serous ovarian carcinoma (TMB 4 Muts/Mb, no targetable mutations, BRCA negative, Ki67 > 90%), PDL1 < 1%, [id number] disease; underwent 6 cycles of Carbo/Taxol 03-06/2018 (done at [facility])\par \par Found to have worsening abdominal and particularly R pelvic LAD on 11/2018. She was then seen by Dr. Maher. Started Doxil/Avastin, 4 cycles 12/2018 – 03/2019 Topotecan/Avastin, 03/2019 – 08/2019, followed by pelvic LN IMRT/IGRT 30gy/5fx with Dr. Romano at [facility]\par \par Abraxane 01/2020, followed by 30gy/5 fx of RT (06/2020), improved periportal, mesenteric, paracolic, and near resolution of L external iliac LAD, with improved R external iliac LAD. Restarted Abraxane 06/2020.\par \par Increase in L external iliac LN 05/2021, agreed to radiate 5 fx every other day and hold off change in therapy, c/b diarrhea. Resumed Abraxane after (c/b neuropathy)\par \par Interval progression of disease in November 2021 with R adrenal gland nodule and increasing L external iliac and mesenteric LAD. Started on Niraparib 12/9/21.\par CT 5/2022 showed an increase in the size of her R adrenal LN and mesenteric LAD with new R retrocaval LAD. S/p adrenal bx 5/25/22 confirming metastatic ovarian carcinoma, Ki67 90%. Foundation showed MSI-S disease, TMB 4 Muts/Mb, ALANA 13.5%, AKT2 amplification, CCNE1 amplification, PIK3CB amplification, TP53 C277*, CIC rearrangement exon 15, MYCL1 amplification, and NFKBIA amplification but no targetable mutations. In 06/2022, she started single agent Gemcitabine on days 1, 8 of 21 day cycle.\par CT 09/2022 showed overall POD with enlarging R adrenal nodule and RP LAD with slightly decreased mesenteric LAD.\par She was planned to start Alimta, but referred to Dr. Au to evaluate for possible clinical trial.\par \par 1/11/23: Surg onc consult with Dr. Shivam Rivas, for removal of progressing adnexal mass and RP adenopathy- not recommended at this time per note\par \par 2/23/2023 OTV. 4/5 fractions of SBRT to right adrenal gland completed. c/o pain over right lateral abd, constipation, nausea at times. Med Onc Dr Maher @ [facility].\par \par 5/5//23 F/U. Pt completed 30 Gy/% Fx of SBRT to right adrenal gland in 2/2023. No abd pain, urinary bother, nausea. PET scan done in 4/2023 showing few abd / pelvic lesions. On chemo with Dr Maher @ [facility].

## 2023-05-08 NOTE — DISEASE MANAGEMENT
[Clinical] : TNM Stage: c [IV] : IV [TTNM] : X [NTNM] : X [MTNM] : 1 [de-identified] : 30 Gy [de-identified] : right adrenal gland

## 2023-07-06 NOTE — REASON FOR VISIT
[Home] : at home, [unfilled] , at the time of the visit. [Medical Office: ([facility])___] : at the medical office located in  [Patient] : the patient [Self] : self

## 2023-07-07 ENCOUNTER — APPOINTMENT (OUTPATIENT)
Dept: RADIATION ONCOLOGY | Facility: CLINIC | Age: 58
End: 2023-07-07
Payer: MEDICAID

## 2023-07-07 ENCOUNTER — APPOINTMENT (OUTPATIENT)
Dept: RADIATION ONCOLOGY | Facility: CLINIC | Age: 58
End: 2023-07-07

## 2023-07-07 PROCEDURE — 99442: CPT

## 2023-07-09 NOTE — HISTORY OF PRESENT ILLNESS
[FreeTextEntry1] : 57 y/o female with h/o radiation to right adrenal mets from metastatic serous cell ovarian carcinoma in 2/2023 presents for f/u.\par \par Oncologic history:\par She initially had abdominal pain in late 2017 with elevated CA-125, found to have b/l adnexal masses; did not have gross metastatic disease at that time. She had a SUAD-BSO, omentectomy, appendectomy, and tumor debulking 1/29/18, and was found to have high grade serous ovarian carcinoma (TMB 4 Muts/Mb, no targetable mutations, BRCA negative, Ki67 > 90%), PDL1 < 1%, [id number] disease; underwent 6 cycles of Carbo/Taxol 03-06/2018 (done at [facility])\par \par Found to have worsening abdominal and particularly R pelvic LAD on 11/2018. She was then seen by Dr. Maher. Started Doxil/Avastin, 4 cycles 12/2018 – 03/2019 Topotecan/Avastin, 03/2019 – 08/2019, followed by pelvic LN IMRT/IGRT 30gy/5fx with Dr. Romano at [facility]\par \par Abraxane 01/2020, followed by 30gy/5 fx of RT (06/2020), improved periportal, mesenteric, paracolic, and near resolution of L external iliac LAD, with improved R external iliac LAD. Restarted Abraxane 06/2020.\par \par Increase in L external iliac LN 05/2021, agreed to radiate 5 fx every other day and hold off change in therapy, c/b diarrhea. Resumed Abraxane after (c/b neuropathy)\par \par Interval progression of disease in November 2021 with R adrenal gland nodule and increasing L external iliac and mesenteric LAD. Started on Niraparib 12/9/21.\par CT 5/2022 showed an increase in the size of her R adrenal LN and mesenteric LAD with new R retrocaval LAD. S/p adrenal bx 5/25/22 confirming metastatic ovarian carcinoma, Ki67 90%. Foundation showed MSI-S disease, TMB 4 Muts/Mb, ALANA 13.5%, AKT2 amplification, CCNE1 amplification, PIK3CB amplification, TP53 C277*, CIC rearrangement exon 15, MYCL1 amplification, and NFKBIA amplification but no targetable mutations. In 06/2022, she started single agent Gemcitabine on days 1, 8 of 21 day cycle.\par CT 09/2022 showed overall POD with enlarging R adrenal nodule and RP LAD with slightly decreased mesenteric LAD.\par She was planned to start Alimta, but referred to Dr. Au to evaluate for possible clinical trial.\par \par 1/11/23: Surg onc consult with Dr. Shivam Rivas, for removal of progressing adnexal mass and RP adenopathy- not recommended at this time per note\par \par 2/23/2023 OTV. 4/5 fractions of SBRT to right adrenal gland completed. c/o pain over right lateral abd, constipation, nausea at times. Med Onc Dr Maher @ [facility].\par \par 7/7//23 F/U. Pt completed 30 Gy/% Fx of SBRT to right adrenal gland in 2/2023. No abd pain, urinary bother, nausea. PET scan done in 4/2023 showing few abd / pelvic lesions. On chemo with Dr Maher @ [facility].\par

## 2023-07-12 ENCOUNTER — APPOINTMENT (OUTPATIENT)
Dept: RADIATION ONCOLOGY | Facility: CLINIC | Age: 58
End: 2023-07-12

## 2023-09-25 ENCOUNTER — NON-APPOINTMENT (OUTPATIENT)
Age: 58
End: 2023-09-25

## 2023-09-26 ENCOUNTER — NON-APPOINTMENT (OUTPATIENT)
Age: 58
End: 2023-09-26

## 2023-09-26 ENCOUNTER — APPOINTMENT (OUTPATIENT)
Dept: NEUROSURGERY | Facility: CLINIC | Age: 58
End: 2023-09-26
Payer: MEDICAID

## 2023-09-26 VITALS
HEIGHT: 65 IN | BODY MASS INDEX: 29.32 KG/M2 | DIASTOLIC BLOOD PRESSURE: 92 MMHG | OXYGEN SATURATION: 99 % | HEART RATE: 99 BPM | SYSTOLIC BLOOD PRESSURE: 139 MMHG | WEIGHT: 176 LBS

## 2023-09-26 DIAGNOSIS — D18.02 HEMANGIOMA OF INTRACRANIAL STRUCTURES: ICD-10-CM

## 2023-09-26 PROCEDURE — 99203 OFFICE O/P NEW LOW 30 MIN: CPT

## 2023-09-26 RX ORDER — GABAPENTIN 300 MG/1
300 CAPSULE ORAL 3 TIMES DAILY
Qty: 90 | Refills: 1 | Status: DISCONTINUED | COMMUNITY
Start: 2020-12-10 | End: 2023-09-26

## 2024-02-27 ENCOUNTER — OUTPATIENT (OUTPATIENT)
Dept: OUTPATIENT SERVICES | Facility: HOSPITAL | Age: 59
LOS: 1 days | Discharge: HOME | End: 2024-02-27

## 2024-02-27 DIAGNOSIS — C56.9 MALIGNANT NEOPLASM OF UNSPECIFIED OVARY: ICD-10-CM

## 2024-02-28 ENCOUNTER — RESULT REVIEW (OUTPATIENT)
Age: 59
End: 2024-02-28

## 2024-02-28 ENCOUNTER — APPOINTMENT (OUTPATIENT)
Dept: HEMATOLOGY ONCOLOGY | Facility: CLINIC | Age: 59
End: 2024-02-28

## 2024-02-28 ENCOUNTER — APPOINTMENT (OUTPATIENT)
Dept: HEMATOLOGY ONCOLOGY | Facility: CLINIC | Age: 59
End: 2024-02-28
Payer: MEDICAID

## 2024-02-28 VITALS
TEMPERATURE: 97.9 F | SYSTOLIC BLOOD PRESSURE: 151 MMHG | RESPIRATION RATE: 17 BRPM | WEIGHT: 178.55 LBS | BODY MASS INDEX: 29.72 KG/M2 | HEART RATE: 90 BPM | DIASTOLIC BLOOD PRESSURE: 96 MMHG | OXYGEN SATURATION: 97 %

## 2024-02-28 LAB
BASOPHILS # BLD AUTO: 0.02 K/UL — SIGNIFICANT CHANGE UP (ref 0–0.2)
BASOPHILS NFR BLD AUTO: 0.3 % — SIGNIFICANT CHANGE UP (ref 0–2)
EOSINOPHIL # BLD AUTO: 0.15 K/UL — SIGNIFICANT CHANGE UP (ref 0–0.5)
EOSINOPHIL NFR BLD AUTO: 2.4 % — SIGNIFICANT CHANGE UP (ref 0–6)
HCT VFR BLD CALC: 37.5 % — SIGNIFICANT CHANGE UP (ref 34.5–45)
HGB BLD-MCNC: 12 G/DL — SIGNIFICANT CHANGE UP (ref 11.5–15.5)
IMM GRANULOCYTES NFR BLD AUTO: 0.5 % — SIGNIFICANT CHANGE UP (ref 0–0.9)
LYMPHOCYTES # BLD AUTO: 0.98 K/UL — LOW (ref 1–3.3)
LYMPHOCYTES # BLD AUTO: 16 % — SIGNIFICANT CHANGE UP (ref 13–44)
MCHC RBC-ENTMCNC: 30 PG — SIGNIFICANT CHANGE UP (ref 27–34)
MCHC RBC-ENTMCNC: 32 G/DL — SIGNIFICANT CHANGE UP (ref 32–36)
MCV RBC AUTO: 93.8 FL — SIGNIFICANT CHANGE UP (ref 80–100)
MONOCYTES # BLD AUTO: 0.59 K/UL — SIGNIFICANT CHANGE UP (ref 0–0.9)
MONOCYTES NFR BLD AUTO: 9.6 % — SIGNIFICANT CHANGE UP (ref 2–14)
NEUTROPHILS # BLD AUTO: 4.37 K/UL — SIGNIFICANT CHANGE UP (ref 1.8–7.4)
NEUTROPHILS NFR BLD AUTO: 71.2 % — SIGNIFICANT CHANGE UP (ref 43–77)
NRBC # BLD: 0 /100 WBCS — SIGNIFICANT CHANGE UP (ref 0–0)
PLATELET # BLD AUTO: 258 K/UL — SIGNIFICANT CHANGE UP (ref 150–400)
RBC # BLD: 4 M/UL — SIGNIFICANT CHANGE UP (ref 3.8–5.2)
RBC # FLD: 13.7 % — SIGNIFICANT CHANGE UP (ref 10.3–14.5)
WBC # BLD: 6.14 K/UL — SIGNIFICANT CHANGE UP (ref 3.8–10.5)
WBC # FLD AUTO: 6.14 K/UL — SIGNIFICANT CHANGE UP (ref 3.8–10.5)

## 2024-02-28 PROCEDURE — 99214 OFFICE O/P EST MOD 30 MIN: CPT

## 2024-02-29 ENCOUNTER — NON-APPOINTMENT (OUTPATIENT)
Age: 59
End: 2024-02-29

## 2024-02-29 LAB
ALBUMIN SERPL ELPH-MCNC: 4.3 G/DL
ALP BLD-CCNC: 125 U/L
ALT SERPL-CCNC: 34 U/L
ANION GAP SERPL CALC-SCNC: 12 MMOL/L
APTT BLD: 36.6 SEC
AST SERPL-CCNC: 33 U/L
BILIRUB SERPL-MCNC: 0.3 MG/DL
BUN SERPL-MCNC: 17 MG/DL
CALCIUM SERPL-MCNC: 10.6 MG/DL
CANCER AG125 SERPL-ACNC: 10 U/ML
CHLORIDE SERPL-SCNC: 103 MMOL/L
CO2 SERPL-SCNC: 24 MMOL/L
CREAT SERPL-MCNC: 0.83 MG/DL
EGFR: 82 ML/MIN/1.73M2
GLUCOSE SERPL-MCNC: 107 MG/DL
HAV IGM SER QL: NONREACTIVE
HBV CORE IGM SER QL: NONREACTIVE
HBV SURFACE AG SER QL: NONREACTIVE
HCV AB SER QL: NONREACTIVE
HCV S/CO RATIO: 0.07 S/CO
INR PPP: 1.28 RATIO
MAGNESIUM SERPL-MCNC: 1.8 MG/DL
POTASSIUM SERPL-SCNC: 4.6 MMOL/L
PROT SERPL-MCNC: 8.3 G/DL
PT BLD: 14.4 SEC
SODIUM SERPL-SCNC: 139 MMOL/L

## 2024-02-29 NOTE — REVIEW OF SYSTEMS
[Fatigue] : fatigue [Recent Change In Weight] : ~T recent weight change [Muscle Pain] : muscle pain [Negative] : Allergic/Immunologic [Fever] : no fever [Dizziness] : no dizziness [Difficulty Walking] : no difficulty walking [de-identified] : headache

## 2024-03-12 ENCOUNTER — APPOINTMENT (OUTPATIENT)
Dept: PEDIATRIC ALLERGY IMMUNOLOGY | Facility: CLINIC | Age: 59
End: 2024-03-12
Payer: MEDICAID

## 2024-03-12 VITALS
DIASTOLIC BLOOD PRESSURE: 107 MMHG | BODY MASS INDEX: 29.22 KG/M2 | HEIGHT: 65 IN | OXYGEN SATURATION: 95 % | WEIGHT: 175.38 LBS | HEART RATE: 91 BPM | SYSTOLIC BLOOD PRESSURE: 161 MMHG

## 2024-03-12 DIAGNOSIS — Z88.0 ALLERGY STATUS TO PENICILLIN: ICD-10-CM

## 2024-03-12 PROCEDURE — 99244 OFF/OP CNSLTJ NEW/EST MOD 40: CPT

## 2024-03-12 RX ORDER — MONTELUKAST 10 MG/1
10 TABLET, FILM COATED ORAL
Qty: 15 | Refills: 1 | Status: ACTIVE | COMMUNITY
Start: 2024-03-12 | End: 1900-01-01

## 2024-03-12 RX ORDER — CETIRIZINE HYDROCHLORIDE 10 MG/1
10 TABLET, COATED ORAL
Qty: 1 | Refills: 1 | Status: ACTIVE | COMMUNITY
Start: 2024-03-12 | End: 1900-01-01

## 2024-03-12 RX ORDER — FAMOTIDINE 20 MG/1
20 TABLET, FILM COATED ORAL
Qty: 1 | Refills: 1 | Status: ACTIVE | COMMUNITY
Start: 2024-03-12 | End: 1900-01-01

## 2024-03-14 LAB
IL6 SERPL-MCNC: 8.6 PG/ML
TRYPTASE: 4.5 UG/L

## 2024-03-16 PROBLEM — Z88.0 HISTORY OF PENICILLIN ALLERGY: Status: ACTIVE | Noted: 2024-03-16

## 2024-03-16 RX ORDER — LISINOPRIL 30 MG/1
TABLET ORAL
Refills: 0 | Status: COMPLETED | COMMUNITY
End: 2024-03-16

## 2024-03-16 RX ORDER — METOPROLOL SUCCINATE 25 MG/1
25 TABLET, EXTENDED RELEASE ORAL
Refills: 0 | Status: ACTIVE | COMMUNITY

## 2024-03-16 RX ORDER — LISINOPRIL 20 MG/1
20 TABLET ORAL
Refills: 0 | Status: ACTIVE | COMMUNITY

## 2024-03-16 NOTE — ASSESSMENT
[FreeTextEntry1] :   59 year old female with HTN, fatty liver disease with gallstones, PE (2018), ventral hernia (not repaired), and  serous ovarian carcinoma  was referred by Dr Au for evaluation of reaction to chemotherapy.  ALLERGIC REACTION TO CHEMOTHERAPY: Sequence of medications/ events carefully reviewed, and reaction is most consistent with allergic reaction to carboplatin (8th lifetime cycle).   - Nest steps in terms of allergy to chemotherapeutic agent were discussed with the patient. Since carboplatin- based regimen is the best treatment choice, next doses will need to be administered via desensitization in the closely monitored setting/. - If she has a systemic reaction during desensitization procedure, please obtain tryptase and interleukin-6 levels within 30-90 minutes of the reaction. - Baseline tryptase and interleukin-6 levels were drawn in my office today.  - If patient tolerates the initial protocol without significant reactions, the same protocol will be used for subsequent infusion. - Aside of her usual premedications with steroids, H2-blocker and antiemetic, recommend premedication with cetirizine 10 mg, Montelukast 10 mg and famotidine 20 mg the night before and 1 hour before desensitization. - Emailed oncology team, they will get back to me on the  target  dose of carboplatin. Desensitization protocol will be sent to Dr Au and team. - Patient is on a beta-blocker and ACE inhibitor. She takes both in the morning. For desensitization procedure Lisinopril and Metoprolol need to be held for 24 hours. I instructed the patient to hold Lisinopril and Metoprolol the morning of desensitization. If agreed by primary and oncology team, she can take additional Amlodipine  (usually takes before bed).  - desensitization needs to be performed in a closely monitored setting, but no ICU admission is necessary.   Desensitization procedure, including indications, potential side effects, including severe allergic reaction, such as anaphylaxis and alternatives (switching to a 2nd choice chemotherapy regimen) were discussed. All questions answered. Patient verbalized understanding.  Remote history of PENICILLIN  ALLERGY: Recommended further evaluation for penicillin sensitivity. We discussed options of penicillin skin testing, followed by physician-supervised challenge vs. going directly to amoxicillin challenge. Skin testing is predictive only for immediate, IgE-mediated allergic reactions. Skin test has not been found to be helpful for remote delayed mild  rashes.  We discussed that many patients labeled with penicillin allergy are, in fact, not allergic to penicillin, as well as  importance of proactively delabeling or confirming penicillin allergy.  Diagnosis of penicillin allergy leads to use of alternative, broader spectrum antibiotics and was demonstrated to cause higher rate of complications and morbidity. We discussed that patient's  history is consistent with low-risk for hypersensitivity reaction to penicillin - Will discuss more in the future.

## 2024-03-16 NOTE — REVIEW OF SYSTEMS
[Fatigue] : fatigue [Nl] : Gastrointestinal [Fever] : no fever [Urticaria] : no urticaria [Swelling] : no swelling [FreeTextEntry9] : muscle pain

## 2024-03-16 NOTE — PHYSICAL EXAM
[Well Nourished] : well nourished [Alert] : alert [Healthy Appearance] : healthy appearance [Sclera Not Icteric] : sclera not icteric [No Acute Distress] : no acute distress [No Thrush] : no thrush [Normal Rate and Effort] : normal respiratory rhythm and effort [No Crackles] : no crackles [Bilateral Audible Breath Sounds] : bilateral audible breath sounds [Regular Rhythm] : with a regular rhythm [Normal Rate] : heart rate was normal  [Soft] : abdomen soft [No Rash] : no rash [Not Tender] : non-tender [Normal Affect] : affect was normal [Normal Mood] : mood was normal [Alert, Awake, Oriented as Age-Appropriate] : alert, awake, oriented as age appropriate [Conjunctival Erythema] : no conjunctival erythema [Wheezing] : no wheezing was heard [Exudate] : no exudate [Pharyngeal erythema] : no pharyngeal erythema [Urticaria] : no urticaria

## 2024-03-16 NOTE — HISTORY OF PRESENT ILLNESS
[Asthma] : asthma [Allergic Rhinitis] : allergic rhinitis [Eczematous rashes] : eczematous rashes [Venom Reactions] : venom reactions [Food Allergies] : food allergies [de-identified] :  LYNSEY SINGLETON is a 59 year old female with HTN, fatty liver disease with gallstones, PE (2018), ventral hernia (not repaired), and  serous ovarian carcinoma  [facility] of reaction to chemotherapy. She had  SUAD-BSO, omentectomy, appendectomy, and tumor debulking 1/29/18, and was found to have high grade serous ovarian carcinoma (TMB 4 Muts/Mb, no targetable mutations, BRCA negative, Ki67 > 90%), PDL1 < 1%, [id number] disease;  = received  6 cycles of Carbo/Taxol 03-06/2018 (done at [facility])  She was started on treatment with retreat Carboplatin in January 2024 but had reaction about 10 min into 2nd cycle on 2/14/24 (treated outpatient, did not need hospital transfer) - hands and  arms got red and itchy, face was red, itchy and burning and tongue felt itchy. There were no associated shortness of breath, back pain or other  symptoms. The infusion was stopped.   Penicillin- hives as a young adult, ~ 30 years ago

## 2024-03-16 NOTE — CONSULT LETTER
[Dear  ___] : Dear  [unfilled], [Consult Letter:] : I had the pleasure of evaluating your patient, [unfilled]. [Please see my note below.] : Please see my note below. [Consult Closing:] : Thank you very much for allowing me to participate in the care of this patient.  If you have any questions, please do not hesitate to contact me. [Sincerely,] : Sincerely, [FreeTextEntry3] : MD HELENA Edgar, BARB Adult and Pediatric Allergy, Asthma and Clinical Immunology Associate Professor of Medicine and Pediatrics at   [facility] Section Head, Adult Allergy and Immunology   [facility]   Division of Allergy, Asthma and Immunology   [address] [place] [address], [place] [address]   Phone [phone]  Fax [phone]

## 2024-03-29 ENCOUNTER — NON-APPOINTMENT (OUTPATIENT)
Age: 59
End: 2024-03-29

## 2024-04-01 LAB
ALBUMIN SERPL ELPH-MCNC: 4.4 G/DL
ALP BLD-CCNC: 158 U/L
ALT SERPL-CCNC: 35 U/L
ANION GAP SERPL CALC-SCNC: 14 MMOL/L
AST SERPL-CCNC: 37 U/L
BILIRUB SERPL-MCNC: 0.4 MG/DL
BUN SERPL-MCNC: 14 MG/DL
CALCIUM SERPL-MCNC: 10.2 MG/DL
CANCER AG125 SERPL-ACNC: 9 U/ML
CHLORIDE SERPL-SCNC: 104 MMOL/L
CO2 SERPL-SCNC: 23 MMOL/L
CREAT SERPL-MCNC: 0.75 MG/DL
EGFR: 92 ML/MIN/1.73M2
GLUCOSE SERPL-MCNC: 107 MG/DL
INR PPP: 1.22 RATIO
MAGNESIUM SERPL-MCNC: 1.8 MG/DL
POTASSIUM SERPL-SCNC: 4.4 MMOL/L
PROT SERPL-MCNC: 8.4 G/DL
PT BLD: 13.7 SEC
SODIUM SERPL-SCNC: 141 MMOL/L

## 2024-04-01 RX ORDER — GLUCAGON INJECTION, SOLUTION 0.5 MG/.1ML
1 INJECTION, SOLUTION SUBCUTANEOUS ONCE
Refills: 0 | Status: DISCONTINUED | OUTPATIENT
Start: 2024-04-02 | End: 2024-04-03

## 2024-04-01 RX ORDER — EPINEPHRINE 0.3 MG/.3ML
0.3 INJECTION INTRAMUSCULAR; SUBCUTANEOUS ONCE
Refills: 0 | Status: DISCONTINUED | OUTPATIENT
Start: 2024-04-02 | End: 2024-04-03

## 2024-04-01 RX ORDER — DIPHENHYDRAMINE HCL 50 MG
50 CAPSULE ORAL ONCE
Refills: 0 | Status: DISCONTINUED | OUTPATIENT
Start: 2024-04-02 | End: 2024-04-03

## 2024-04-01 RX ORDER — GLUCAGON INJECTION, SOLUTION 0.5 MG/.1ML
2 INJECTION, SOLUTION SUBCUTANEOUS ONCE
Refills: 0 | Status: DISCONTINUED | OUTPATIENT
Start: 2024-04-02 | End: 2024-04-03

## 2024-04-01 RX ORDER — DIPHENHYDRAMINE HCL 50 MG
50 CAPSULE ORAL ONCE
Refills: 0 | Status: COMPLETED | OUTPATIENT
Start: 2024-04-02 | End: 2024-04-02

## 2024-04-01 RX ORDER — SODIUM CHLORIDE 9 MG/ML
1000 INJECTION INTRAMUSCULAR; INTRAVENOUS; SUBCUTANEOUS
Refills: 0 | Status: DISCONTINUED | OUTPATIENT
Start: 2024-04-02 | End: 2024-04-03

## 2024-04-01 RX ORDER — CARBOPLATIN 50 MG
590 VIAL (EA) INTRAVENOUS ONCE
Refills: 0 | Status: COMPLETED | OUTPATIENT
Start: 2024-04-02 | End: 2024-04-02

## 2024-04-01 RX ORDER — ALBUTEROL 90 UG/1
2.5 AEROSOL, METERED ORAL ONCE
Refills: 0 | Status: DISCONTINUED | OUTPATIENT
Start: 2024-04-02 | End: 2024-04-03

## 2024-04-01 RX ORDER — CETIRIZINE HYDROCHLORIDE 10 MG/1
10 TABLET ORAL ONCE
Refills: 0 | Status: COMPLETED | OUTPATIENT
Start: 2024-04-02 | End: 2024-04-02

## 2024-04-02 ENCOUNTER — INPATIENT (INPATIENT)
Facility: HOSPITAL | Age: 59
LOS: 0 days | Discharge: HOME | End: 2024-04-03
Attending: INTERNAL MEDICINE | Admitting: INTERNAL MEDICINE
Payer: MEDICAID

## 2024-04-02 VITALS — WEIGHT: 176.37 LBS | HEIGHT: 65 IN

## 2024-04-02 DIAGNOSIS — C56.9 MALIGNANT NEOPLASM OF UNSPECIFIED OVARY: ICD-10-CM

## 2024-04-02 LAB
ALBUMIN SERPL ELPH-MCNC: 4 G/DL — SIGNIFICANT CHANGE UP (ref 3.3–5)
ALP SERPL-CCNC: 143 U/L — HIGH (ref 40–120)
ALT FLD-CCNC: 35 U/L — HIGH (ref 4–33)
ANION GAP SERPL CALC-SCNC: 11 MMOL/L — SIGNIFICANT CHANGE UP (ref 7–14)
AST SERPL-CCNC: 30 U/L — SIGNIFICANT CHANGE UP (ref 4–32)
BASOPHILS # BLD AUTO: 0.02 K/UL — SIGNIFICANT CHANGE UP (ref 0–0.2)
BASOPHILS NFR BLD AUTO: 0.3 % — SIGNIFICANT CHANGE UP (ref 0–2)
BILIRUB SERPL-MCNC: 0.3 MG/DL — SIGNIFICANT CHANGE UP (ref 0.2–1.2)
BUN SERPL-MCNC: 12 MG/DL — SIGNIFICANT CHANGE UP (ref 7–23)
CALCIUM SERPL-MCNC: 9.8 MG/DL — SIGNIFICANT CHANGE UP (ref 8.4–10.5)
CHLORIDE SERPL-SCNC: 105 MMOL/L — SIGNIFICANT CHANGE UP (ref 98–107)
CO2 SERPL-SCNC: 24 MMOL/L — SIGNIFICANT CHANGE UP (ref 22–31)
CREAT SERPL-MCNC: 0.64 MG/DL — SIGNIFICANT CHANGE UP (ref 0.5–1.3)
EGFR: 102 ML/MIN/1.73M2 — SIGNIFICANT CHANGE UP
EOSINOPHIL # BLD AUTO: 0.22 K/UL — SIGNIFICANT CHANGE UP (ref 0–0.5)
EOSINOPHIL NFR BLD AUTO: 2.9 % — SIGNIFICANT CHANGE UP (ref 0–6)
GLUCOSE SERPL-MCNC: 106 MG/DL — HIGH (ref 70–99)
HCT VFR BLD CALC: 34.2 % — LOW (ref 34.5–45)
HGB BLD-MCNC: 11.2 G/DL — LOW (ref 11.5–15.5)
IANC: 5.58 K/UL — SIGNIFICANT CHANGE UP (ref 1.8–7.4)
IMM GRANULOCYTES NFR BLD AUTO: 0.9 % — SIGNIFICANT CHANGE UP (ref 0–0.9)
LYMPHOCYTES # BLD AUTO: 1.11 K/UL — SIGNIFICANT CHANGE UP (ref 1–3.3)
LYMPHOCYTES # BLD AUTO: 14.5 % — SIGNIFICANT CHANGE UP (ref 13–44)
MAGNESIUM SERPL-MCNC: 1.8 MG/DL — SIGNIFICANT CHANGE UP (ref 1.6–2.6)
MCHC RBC-ENTMCNC: 29.8 PG — SIGNIFICANT CHANGE UP (ref 27–34)
MCHC RBC-ENTMCNC: 32.7 GM/DL — SIGNIFICANT CHANGE UP (ref 32–36)
MCV RBC AUTO: 91 FL — SIGNIFICANT CHANGE UP (ref 80–100)
MONOCYTES # BLD AUTO: 0.67 K/UL — SIGNIFICANT CHANGE UP (ref 0–0.9)
MONOCYTES NFR BLD AUTO: 8.7 % — SIGNIFICANT CHANGE UP (ref 2–14)
NEUTROPHILS # BLD AUTO: 5.58 K/UL — SIGNIFICANT CHANGE UP (ref 1.8–7.4)
NEUTROPHILS NFR BLD AUTO: 72.7 % — SIGNIFICANT CHANGE UP (ref 43–77)
NRBC # BLD: 0 /100 WBCS — SIGNIFICANT CHANGE UP (ref 0–0)
NRBC # FLD: 0 K/UL — SIGNIFICANT CHANGE UP (ref 0–0)
PHOSPHATE SERPL-MCNC: 3.4 MG/DL — SIGNIFICANT CHANGE UP (ref 2.5–4.5)
PLATELET # BLD AUTO: 256 K/UL — SIGNIFICANT CHANGE UP (ref 150–400)
POTASSIUM SERPL-MCNC: 4.1 MMOL/L — SIGNIFICANT CHANGE UP (ref 3.5–5.3)
POTASSIUM SERPL-SCNC: 4.1 MMOL/L — SIGNIFICANT CHANGE UP (ref 3.5–5.3)
PROT SERPL-MCNC: 8.4 G/DL — HIGH (ref 6–8.3)
RBC # BLD: 3.76 M/UL — LOW (ref 3.8–5.2)
RBC # FLD: 13.2 % — SIGNIFICANT CHANGE UP (ref 10.3–14.5)
SODIUM SERPL-SCNC: 140 MMOL/L — SIGNIFICANT CHANGE UP (ref 135–145)
WBC # BLD: 7.67 K/UL — SIGNIFICANT CHANGE UP (ref 3.8–10.5)
WBC # FLD AUTO: 7.67 K/UL — SIGNIFICANT CHANGE UP (ref 3.8–10.5)

## 2024-04-02 PROCEDURE — 99223 1ST HOSP IP/OBS HIGH 75: CPT

## 2024-04-02 RX ORDER — DEXAMETHASONE 0.5 MG/5ML
8 ELIXIR ORAL ONCE
Refills: 0 | Status: COMPLETED | OUTPATIENT
Start: 2024-04-02 | End: 2024-04-02

## 2024-04-02 RX ORDER — ONDANSETRON 8 MG/1
16 TABLET, FILM COATED ORAL ONCE
Refills: 0 | Status: COMPLETED | OUTPATIENT
Start: 2024-04-02 | End: 2024-04-02

## 2024-04-02 RX ORDER — ACETAMINOPHEN 500 MG
1000 TABLET ORAL ONCE
Refills: 0 | Status: COMPLETED | OUTPATIENT
Start: 2024-04-02 | End: 2024-04-02

## 2024-04-02 RX ORDER — METOCLOPRAMIDE HCL 10 MG
10 TABLET ORAL ONCE
Refills: 0 | Status: COMPLETED | OUTPATIENT
Start: 2024-04-02 | End: 2024-04-02

## 2024-04-02 RX ORDER — DIPHENHYDRAMINE HCL 50 MG
25 CAPSULE ORAL ONCE
Refills: 0 | Status: DISCONTINUED | OUTPATIENT
Start: 2024-04-02 | End: 2024-04-03

## 2024-04-02 RX ORDER — DIPHENHYDRAMINE HCL 50 MG
50 CAPSULE ORAL ONCE
Refills: 0 | Status: DISCONTINUED | OUTPATIENT
Start: 2024-04-02 | End: 2024-04-03

## 2024-04-02 RX ORDER — MONTELUKAST 4 MG/1
10 TABLET, CHEWABLE ORAL ONCE
Refills: 0 | Status: COMPLETED | OUTPATIENT
Start: 2024-04-02 | End: 2024-04-02

## 2024-04-02 RX ORDER — CETIRIZINE HYDROCHLORIDE 10 MG/1
10 TABLET ORAL ONCE
Refills: 0 | Status: COMPLETED | OUTPATIENT
Start: 2024-04-02 | End: 2024-04-02

## 2024-04-02 RX ORDER — INFLUENZA VIRUS VACCINE 15; 15; 15; 15 UG/.5ML; UG/.5ML; UG/.5ML; UG/.5ML
0.5 SUSPENSION INTRAMUSCULAR ONCE
Refills: 0 | Status: DISCONTINUED | OUTPATIENT
Start: 2024-04-02 | End: 2024-04-03

## 2024-04-02 RX ORDER — FAMOTIDINE 10 MG/ML
20 INJECTION INTRAVENOUS ONCE
Refills: 0 | Status: COMPLETED | OUTPATIENT
Start: 2024-04-02 | End: 2024-04-02

## 2024-04-02 RX ADMIN — ONDANSETRON 116 MILLIGRAM(S): 8 TABLET, FILM COATED ORAL at 13:17

## 2024-04-02 RX ADMIN — Medication 1000 MILLIGRAM(S): at 22:15

## 2024-04-02 RX ADMIN — Medication 50 MILLIGRAM(S): at 15:56

## 2024-04-02 RX ADMIN — Medication 10 MILLIGRAM(S): at 12:27

## 2024-04-02 RX ADMIN — Medication 590 MILLIGRAM(S): at 14:15

## 2024-04-02 RX ADMIN — SODIUM CHLORIDE 250 MILLILITER(S): 9 INJECTION INTRAMUSCULAR; INTRAVENOUS; SUBCUTANEOUS at 14:48

## 2024-04-02 RX ADMIN — Medication 400 MILLIGRAM(S): at 22:00

## 2024-04-02 RX ADMIN — CETIRIZINE HYDROCHLORIDE 10 MILLIGRAM(S): 10 TABLET ORAL at 16:27

## 2024-04-02 RX ADMIN — Medication 101.6 MILLIGRAM(S): at 12:32

## 2024-04-02 RX ADMIN — CETIRIZINE HYDROCHLORIDE 10 MILLIGRAM(S): 10 TABLET ORAL at 12:22

## 2024-04-02 RX ADMIN — FAMOTIDINE 20 MILLIGRAM(S): 10 INJECTION INTRAVENOUS at 12:29

## 2024-04-02 RX ADMIN — MONTELUKAST 10 MILLIGRAM(S): 4 TABLET, CHEWABLE ORAL at 12:22

## 2024-04-02 RX ADMIN — Medication 1 MILLIGRAM(S): at 13:54

## 2024-04-02 NOTE — H&P ADULT - NSICDXPASTSURGICALHX_GEN_ALL_CORE_FT
January 12, 2024     Pérez Traylor MD  [address] [facility] [id number]    Patient: Josee Henson   YOB: 1934   Date of Visit: 1/8/2024       Dear Dr. Pérez Traylor MD:    Thank you for referring Josee Henson to me for evaluation. Below are my notes for this consultation.  If you have questions, please do not hesitate to call me. I look forward to following your patient along with you.       Sincerely,     Cody Khanna MD      CC: Bashir Smith MD  ______________________________________________________________________________________    Cardio: [place]      HPI: 89-year-old white female with a past medical history significant for aortic stenosis, hypertension, hyperlipidemia, chronic kidney disease, glucose intolerance, and obesity was referred to us for aortic stenosis.  Patient states that she has symptoms of tiredness and shortness of breath for last 2 years or so but also has back pain and leg pain which limits her walking.  She endorses increasing fatigue over the last year or so but still able to do her activities like grocery walking slowly etc.  Patient uses cane for longer distances.    She does do balance exercises twice a week with a .   She has more chronic dyspnea on exertion when walking prolonged distances that has not changed in the last year.  The patient denies chest pain, palpitations, lightheadedness, syncope, orthopnea, paroxysmal nocturnal dyspnea, lower extremity edema, or bleeding problems.     The patient has no prior history of coronary artery disease, cerebrovascular disease, venous thromboembolic disease, diabetes, peripheral arterial disease/claudication, or bleeding.           ROS:    Constitutional: fatigue  Eyes: no eyesight problems, no blurred vision, no diplopia, no eye pain, no purulent discharge from the eyes, eyes not red, no dryness of the eyes and no itching of the eyes.   ENT: no nosebleeds, no hearing loss, no tinnitus, no earache, no  sore throat, no hoarseness, no swollen glands in the neck and no nasal discharge.   Cardiovascular: dyspnea on exertion, no chest pain  Respiratory: no chronic cough, not coughing up sputum, no wheezing that is consistent with asthma  Gastrointestinal: no change in bowel habits, no blood in stools, no diarrhea, no constipation, no nausea, no vomiting, no abdominal pain, no signs and symptoms of ulcer disease, no sybil colored stools and no intolerance to fatty foods.   Genitourinary: no hematuria,  no urinary frequency, no dysuria, no incontinence, no burning sensation during urination, no urinary hesitancy, no nocturia, no genital lesion, no testicular pain, urinary stream is not smaller and urinary stream does not start and stop.   Musculoskeletal: no arthralgias, no myalgias, no joint swelling, no joint stiffness, no muscle weakness, no back pain, no limb pain, no limb swelling and no difficulty walking.   Skin: no skin rashes, no change in skin color and pigmentation, no skin lesions and no skin lumps.   Neurological: no seizures, no frequent falls, no headaches, no dizziness, no tingling, no fainting and no limb weakness.   Psychiatric: no depression, not suicidal, no confusion, no memory lapses or loss, no anxiety, no personality change and no emotional problems.   Endocrine: no goiter, no thyroid disorder, no diabetes mellitus, no excessive thirst, no dry skin, no cold intolerance, no heat intolerance, no erectile dysfunction, no increased urinary frequency, no proptosis and no deepening of the voice.   Hematologic/Lymphatic: no bleeding issues.   All other systems have been reviewed and are negative for complaint.       TAVR Workup:   - NYHA: 2  - Frailty 3/5:   - EKG: Normal sinus rhythm, QRS 90 ms   - TTE: 11/30/2023 EF 60%, dimensionless index 0.23 peak gradient 50 mmHg mean gradient 29 mmHg, aortic valve area 0.56 cm² V-max 3.5 m/s  There is low flow across the aortic valve, with a normal ejection  "fraction.   - CT TAVR: Pending  - LHC: Pending  - dental clearance: Pending    - STS  Procedure Type: Isolated AVR  Perioperative Outcome Estimate %  Operative Mortality 7.71%  Morbidity & Mortality 13.3%  Stroke 1.9%  Renal Failure 3.94%  Reoperation 3.14%  Prolonged Ventilation 7.37%  Deep Sternal Wound Infection 0.046%  Long Hospital Stay (>14 days) 7.14%  Short Hospital Stay (<6 days)* 24.3%      Physical Exam:  Constitutional: alert and in no acute distress.   Eyes: no erythema, swelling or discharge from the eye .   ENT: no erythema, edema, exudate or lesions .   Neck: neck is supple, no JVD  Pulmonary: no increased work of breathing or signs of respiratory distress , lungs clear to auscultation. , normal percussion of chest  and chest palpation normal .   Cardiovascular: RRR, 3/6 AMPARO RUSB,  no pitting edema  Abdomen: abdomen non-tender, no masses  and no hepatomegaly .   Neurologic: non-focal neurologic examination.           6/13/2023    11:20 AM 7/3/2023     1:10 PM 11/9/2023    11:21 AM 1/3/2024     1:12 PM 1/4/2024     9:35 AM 1/4/2024     9:36 AM 1/8/2024     9:05 AM   Vitals   Systolic 130 122 122 151 123 119 147   Diastolic 68 72 78 68 75 60 83   Heart Rate 70 56 78 58 68  52   Temp  36.7 °C (98 °F)  36.2 °C (97.2 °F)      Resp 16 18  16      Height (in) 1.549 m (5' 1\")  1.54 m (5' 0.63\")  1.549 m (5' 1\")  1.549 m (5' 1\")   Weight (lb) 161.4 160.38 163.14 161.49 160.2  160   BMI 30.5 kg/m2 30.3 kg/m2 31.2 kg/m2 30.89 kg/m2 30.27 kg/m2  30.23 kg/m2   BSA (m2) 1.77 m2 1.77 m2 1.78 m2 1.77 m2 1.77 m2  1.77 m2   Visit Report   Report  Report Report Report    Report        Current Outpatient Medications   Medication Instructions   • acetaminophen (Tylenol Extra Strength) 500 mg tablet 1 tablet, oral, As directed.<BR>   • amoxicillin (Amoxil) 500 mg capsule TAKE 4 CAPSULES BY MOUTH 1 HOUR BEFORE PROCEDURE   • aspirin (Ecotrin Low Strength) 81 mg EC tablet 1 tablet, oral, Nightly   • atorvastatin (LIPITOR) 10 " mg, oral, Daily   • biotin 5 mg capsule 1 capsule, oral, Daily   • calcium carbonate-vitamin D3 (Oscal-500) 500 mg-10 mcg (400 unit) tablet 2 tablets, oral, Nightly   • denosumab (Prolia) 60 mg/mL syringe 1 mL, subcutaneous, Every 6 months   • ergocalciferol (Vitamin D-2) 1.25 MG (96749 UT) capsule 1 capsule, oral, Weekly   • fexofenadine (Allegra Allergy) 180 mg tablet 1 tablet, oral, As directed.<BR>   • flaxseed oiL 1,000 mg capsule 1 capsule, oral, 2 times daily   • fluticasone (Flonase) 50 mcg/actuation nasal spray 2 sprays, Each Nostril, Daily   • ibuprofen (AdviL) 200 mg tablet 2 tablets, oral, Every 6 hours PRN   • levothyroxine (Synthroid, Levoxyl) 125 mcg tablet 1 tablet, oral, Daily   • mv-min-folic-K1-lycopen-lutein (Centrum Silver Ultra Men's) 472--300 mcg tablet 1 tablet, oral, Daily   • omeprazole (PriLOSEC) 20 mg DR capsule 1 capsule, oral, Daily   • quiNINE (Qualaquin) 324 mg capsule 1 capsule, oral, As directed.   • valsartan-hydrochlorothiazide (Diovan-HCT) 80-12.5 mg tablet 1 tablet, oral, Daily   • zolpidem (AMBIEN) 5 mg, oral, Nightly PRN        Impression:   This is a 89-year-old female with past medical history of hypertension who is symptomatic from her aortic stenosis.  Although the peak velocity is 3.54 m/s with mean gradient of 29 mmHg, is concern for low flow across the aortic valve despite normal LV function    Plan:   -Patient will get a CT TAVR  -Patient will also need a left heart cath      We discussed all the risks associated with the procedure, including but not limited to stroke, MI, pericardial tamponade, vascular complications, infection and death were discussed with the patient. The risk of needing a permament pacemaker was also discussed in detail. The patient verbalized understanding and decided to proceed with the procedure.     We will discuss this patient's case at our Valve Team meeting with representatives from [facility] Heart and Cardiac Surgery. Our nurse  navigators will contact patient with further diagnostic needs and formal plan.       Attestation signed by Cody Khanna MD at 1/12/2024 11:54 AM:  I saw and evaluated the patient. I personally obtained the key and critical portions of the history and physical exam or was physically present for key and critical portions performed by the resident/fellow. I reviewed the resident/fellow's documentation and discussed the patient with the resident/fellow. I agree with the resident/fellow's medical decision making as documented in the note.    Mrs. Henson is an 89-year-old woman who is a patient of Dr. Bashir Selby; Dr. Pérez Traylor is her cardiologist.  I have been asked to see her to evaluate aortic stenosis.  She has become more dyspneic with minimal exertion, and fatigued over the last 2 years.  She is able to do her activities of daily living, but activities such as going to the grocery store make her dyspneic and fatigued.  Her imaging is consistent with moderate to severe aortic stenosis.  Her ejection fraction is 60%, dimensionless index 0.23, mean gradient 29 mmHg, aortic valve area calculated 0.5 cm².  In further evaluation we will obtain a computed tomography-DORIE protocol, aortic valve calcium scoring, left heart catheterization to determine concomitant coronary artery disease.    We had a nice discussion regarding the indications for intervention on her valve disease, I do believe that a catheter-based approach would be first-line therapy given her advanced age and comorbidities.  It is likely that her aortic valve stenosis is severe based on echocardiographic imaging, and we will obtain an aortic valve calcium scoring to help verify this.  We specifically discussed the goals of this procedure being relief of symptoms, preservation of left ventricular function, and prolonging life.  We discussed the specific risks of vascular access site bleeding, stroke, and need for from the pacemaker.    With regards  to her surgical candidacy, given her advanced age and comorbidities she is high risk surgical candidate, I would not offer her open valvular replacement.     PAST SURGICAL HISTORY:   delivery delivered

## 2024-04-02 NOTE — H&P ADULT - NSHPLABSRESULTS_GEN_ALL_CORE
Admission labs pending. 11.2                        140  | 24   | 12           7.67  >-----------< 256     ------------------------< 106                      34.2                       4.1  | 105  | 0.64           Ca 9.8   Mg 1.80  Ph 3.4

## 2024-04-02 NOTE — H&P ADULT - ASSESSMENT
"Date: 2020 16:19:23  Clinician: Darlin You  Clinician NPI: [id number]  Patient: Jasmyne Escalante  Patient : 2000  Patient Address: [address] [place] [address]  Patient Phone: [phone]  Visit Protocol: URI  Patient Summary:  Jasmyne is a 19 year old ( : 2000 ) male who initiated a Visit for COVID-19 (Coronavirus) evaluation and screening. When asked the question \"Please sign me up to receive news, health information and promotions. \", Jasmyne responded \"Yes\".    Jasmyne states his symptoms started 1-2 days ago.   His symptoms consist of rhinitis, a cough, nasal congestion, malaise, and a headache. He is experiencing difficulty breathing due to nasal congestion but he is not short of breath.   Symptom details     Nasal secretions: The color of his mucus is green.    Cough: Jasmyne coughs a few times an hour and his cough is not more bothersome at night. Phlegm comes into his throat when he coughs. He believes his cough is caused by post-nasal drip. The color of the phlegm is green.     Headache: He states the headache is mild (1-3 on a 10 point pain scale).      Jasmyne denies having ear pain, enlarged lymph nodes, facial pain or pressure, myalgias, wheezing, sore throat, chills, teeth pain, and fever. He also denies having a sinus infection within the past year, taking antibiotic medication for the symptoms, and having recent facial or sinus surgery in the past 60 days.   Precipitating events  He has not recently been exposed to someone with influenza. Jasmyne has not been in close contact with any high risk individuals.   Pertinent COVID-19 (Coronavirus) information  Jasmyne has not traveled internationally or to the areas where COVID-19 (Coronavirus) is widespread, including cruise ship travel in the last 14 days before the start of his symptoms.   Jasmyne has not had a close contact with a laboratory-confirmed COVID-19 patient within 14 days of symptom onset. He also has not " had a close contact with a suspected COVID-19 patient within 14 days of symptom onset.   Jasmyne is not a healthcare worker and does not work in a healthcare facility.   Pertinent medical history  Jasmyne does not need a return to work/school note.   Weight: 150 lbs   Jasmyne does not smoke or use smokeless tobacco.   Height: 5 ft 10 in  Weight: 150 lbs    MEDICATIONS: Daytime-Nighttime oral, ALLERGIES: NKDA  Clinician Response:  Dear Jasmyne,  Based on the information provided, you have an influenza-like illness. This is an infection that has the same symptoms of the flu, but the specific virus is not known. Lab testing would be required to confirm the flu virus, but this is often not necessary because the treatment will be the same no matter what is causing your symptoms.  Your symptoms should improve gradually over the next week.  Medication information  I am prescribing:     Relenza Diskhaler 5 mg/actuation inhalation blister with device. Inhale 2 puffs by mouth 2 times per day for 5 days. There are no refills with this prescription.   Unless you are allergic to the over-the-counter medication(s) below, I recommend using:       Acetaminophen (Tylenol or store brand) oral tablet. Take 1-2 tablets by mouth every 4-6 hours to help with the discomfort.      Ibuprofen (Advil or store brand) 200 mg oral tablet. Take 1-3 tablets (200-600 mg) by mouth every 8 hours to help with the discomfort. Make sure to take the ibuprofen with food. Do not exceed 2400 mg in 24 hours.    A decongestant such as Sudafed PE or store brand.      Dextromethorphan (Robitussin DM or store brand). This medication is a cough suppressant that works by decreasing the feeling of needing to cough. Please follow the instructions on the package.     Over-the-counter medications do not require a prescription. Ask the pharmacist if you have any questions.  Self care  Steps you can take to be as comfortable as possible:     Rest.    Drink plenty of  water and other liquids.    Take a hot shower to loosen congestion    Take a spoonful of honey to reduce your cough.     If you have a fever, stay home until your temperature has returned to normal for 24 hours and you feel well enough for daily activities. And of course, wash your hands often to prevent spreading the flu and other illnesses. However, the best way to prevent the flu is to get a flu shot before each flu season.  When to seek care  Please be seen in a clinic or urgent care if new symptoms develop, or symptoms become worse.  Additional treatment plan   Based on the information you have provided, you do have symptoms that are consistent with Coronavirus (COVID-19).  The coronavirus causes mild to severe respiratory illness with the most common symptoms including fever, cough and difficulty breathing. Unfortunately, many viruses cause similar symptoms and it can be difficult to distinguish between viruses, especially in mild cases, so we are presuming that anyone with cough or fever has coronavirus at this time.  Coronavirus/COVID-19 has reached the point of community spread in [place], meaning that we are finding the virus in people with no known exposure risk for dari the virus. Given the increasing commonness of coronavirus in the community we are no longer testing patients who are not critically ill.  If you are a health care worker, you should refer to your employee health office for instructions about testing and returning to work.  For everyone else who has cough or fever, you should assume you are infected with coronavirus. Since you will not be tested but have symptoms that may be consistent with coronavirus, the CDC recommends you stay in self-isolation until these three things have happened:    You have had no fever for at least 72 hours (that is three full days of no fever without the use of medicine that reduces fevers)    AND   Other symptoms have improved (for example, when your  cough or shortness of breath have improved)   AND   At least 7 days have passed since your symptoms first appeared.   How to Isolate:   Isolate yourself at home.  Do Not allow any visitors  Do Not go to work or school  Do Not go to Yaz[employer],  centers, shopping, or other public places.  Do Not shake hands.  Avoid close contact with others (hugging, kissing).   Protect Others:   Cover Your Mouth and Nose with a mask, disposable tissue or wash cloth to avoid spreading germs to others.  Wash your hands and face frequently with soap and water.   We know it can be scary to hear that you might have COVID-19. Our team can help track your symptoms and make sure you are doing ok over the next two weeks using a program called [employer] to keep in touch. When you receive an email from [employer], please consider enrolling in our monitoring program. There is no cost to you for monitoring. Here is a URL where you can learn more: [email or web address]  Managing Symptoms:   At this time, we primarily recommend Tylenol (Acetaminophen) for fever or pain. If you have liver or kidney problems, contact your primary care provider for instructions on use of tylenol. Adults can take 650 mg (two 325 mg pills) by mouth every 4-6 hours as needed OR 1,000 mg (two 500 mg pills) every 8 hours as needed. MAXIMUM DAILY DOSE: 3,000mg. For children, refer to dosing on bottle based on age or weight.   If you develop significant shortness of breath that prevents you from doing normal activities, please call 911 or proceed to the nearest emergency room and alert them immediately that you have been in self-isolation for possible coronavirus.  For more information about COVID19 and options for caring for yourself at home, please visit the CDC website at https://www.cdc.gov/coronavirus/2019-ncov/about/steps-when-sick.htmlFor more options for care at [facility], please visit our website at  [email or web address]    COVID-19 (Coronavirus) General Information  With the increase in the number of COVID-19 (Coronavirus) cases, we understand you may have some questions. Below is some helpful information on COVID-19 (Coronavirus).  How can I protect myself and others from the COVID-19 (Coronavirus)?  Because there is currently no vaccine to prevent infection, the best way to protect yourself is to avoid being exposed to this virus. Put distance between yourself and other people if COVID-19 (Coronavirus) is spreading in your community. The virus is thought to spread mainly from person-to-person.     Between people who are in close contact with one another (within about 6 about) for a prolonged period (10 minutes or longer).    Through respiratory droplets produced when an infected person coughs or sneezes.     The CDC recommends the following additional steps to protect yourself and others:     Wash your hands often with soap and water for at least 20 seconds, especially after blowing your nose, coughing, or sneezing; going to the bathroom; and before eating or preparing food.  Use an alcohol-based hand  that contains at least 60 percent alcohol if soap and water are not available.        Avoid touching your eyes, nose and mouth with unwashed hands.    Avoid close contact with people who are sick.    Stay home when you are sick.    Cover your cough or sneeze with a tissue, then throw the tissue in the trash.    Clean and disinfect frequently touched objects and surfaces.     You can help stop COVID-19 (Coronavirus) by knowing the signs and symptoms:     Fever    Cough    Shortness of breath     Contact your healthcare provider if   Develop symptoms   AND   Have been in close contact with a person known to have COVID-19 (Coronavirus) or live in or have recently traveled from an area with ongoing spread of COVID-19 (Coronavirus). Call ahead before you go to a doctor's office  or emergency room. Tell them about your recent travel and your symptoms.   For the most up to date information, visit the CDC's website.  Self-monitoring  Self-monitoring means people should monitor themselves for fever by taking their temperatures twice a day and remain alert for a cough or difficulty breathing.  It is important to check your health two times each day for 14 days after a potential exposure to a person with COVID-19 (Coronavirus) or after travel from a location where COVID-19 (Coronavirus) is widespread. If you have been exposed to a person with COVID-19 (Coronavirus), it may take up to 14 days to know if you will get sick. Follow the steps below to check and record your health.     Take your temperature with a thermometer twice a day, once in the morning and once in the evening, and watch for a cough or difficulty breathing for 14 days.    Write down your temperature and any COVID-19 symptoms you may have: feeling feverish, coughing, or difficulty breathing.    Stay home from work or school.    Do not take public transportation, taxis, or ride-shares.    Avoid crowded places (such as shopping centers and movie theaters) and limit your activities in public.    Keep your distance from others (about 6 feet or 2 meters).    If you get sick with fever, cough, or trouble breathing, contact your healthcare provider and tell them about your recent travel and/or your symptoms.    If you need to seek medical care for other reasons, such as dialysis, call ahead to your doctor and tell them about your recent travel.     Steps to help prevent the spread of COVID-19 (Coronavirus) if you are sick  If you are sick with COVID-19 (Coronavirus) or suspect you are infected with the virus that causes COVID-19 (Coronavirus), follow the steps below to help prevent the disease from spreading&nbsp;to people in your home and community.     Stay home except to get medical care. Home isolation may be started in consultation  "with your healthcare clinician.    Separate yourself from other people and animals in your home.    Call ahead before visiting your doctor if you have a medical appointment.    Wear a facemask when you are around other people.    Cover your cough and sneezes.    Clean your hands often.    Avoid sharing personal household items.    Clean and disinfect frequently touched objects and surfaces everyday.    You will need to have someone drop off medications or household supplies (if needed) at your house without coming inside or in contact with you or others living in your house.    Monitor your symptoms and seek prompt medical care if your illness is worsening (e.g. Difficulty breathing).    Discontinue home isolation only in consultation with your healthcare provider.     For more detailed and up to date information on what to do if you are sick, visit this link: What to Do If You Are Sick With Coronavirus Disease 2019 (COVID-19).  Do I need to be tested for COVID-19 (Coronavirus)?     At this time, the limited number of available tests are controlled by the state and local health departments and are being reserved for more seriously ill patients, those with known exposure to confirmed patients, and those with recent travel (within 14 days) to countries with high rates of COVID-19 (Coronavirus).    Decisions on which patients receive testing will be based on the local spread of COVID-19 (Coronavirus) as well as the symptoms. Your healthcare provider will make the final decision on whether you should be tested.    In the meantime, if you have concerns that you may have been exposed, it is reasonable to practice \"social distancing.\"&nbsp; If you are ill with a cold or flu-like illness, please monitor your symptoms and reach out to your healthcare provider if your symptoms worsen.    For more up to date information, visit this link: COVID-19 (Coronavirus) Frequently Asked Questions and Answers.      Diagnosis: Coronavirus " infection, unspecified  Diagnosis ICD: B34.2  Prescription: Relenza Diskhaler 5 mg/actuation inhalation blister with device 1 20 inhalation box, 5 days supply. Inhale 2 puffs by mouth 2 times per day for 5 days. 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 Pt is directly admitted for C3 Carboplatin with desensitization procotol.    ACTIVE PROBLEMS  Met ovarian ca  Admission for chemotherapy  Desensitization to Allergen    - Proceeding with desensitization protocol as planned    - Monitoring for signs of hypersensitivity reaction/anaphylaxis; will tx as per protocol recommended by Allergy    - Pt to continue outpt fu with Dr. Au after discharge    - GI/DVT ppx: diet/ambulation

## 2024-04-02 NOTE — PROVIDER CONTACT NOTE (MEDICATION) - SITUATION
Pt receiving Carboplatin Desesitization , when pt at step 6  3.0cc/hr , noted reddness on palms and face noted

## 2024-04-02 NOTE — PATIENT PROFILE ADULT - FALL HARM RISK - HARM RISK INTERVENTIONS

## 2024-04-02 NOTE — PROVIDER CONTACT NOTE (MEDICATION) - ACTION/TREATMENT ORDERED:
Benadryl 50mg IV given x1, Cetrizine 10mg po x1. when rash resolves will resume at 3cc/hr x 20min and follow protocol

## 2024-04-02 NOTE — H&P ADULT - NSHPPHYSICALEXAM_GEN_ALL_CORE
Admission VS pending. Vital Signs Last 24 Hrs  T(C): 37.1 (02 Apr 2024 20:30), Max: 37.2 (02 Apr 2024 15:25)  T(F): 98.7 (02 Apr 2024 20:30), Max: 99 (02 Apr 2024 15:25)  HR: 102 (02 Apr 2024 20:30) (70 - 108)  BP: 148/96 (02 Apr 2024 20:30) (116/76 - 152/95)  RR: 17 (02 Apr 2024 20:30) (17 - 18)  SpO2: 99% (02 Apr 2024 20:30) (95% - 99%)  Parameters below as of 02 Apr 2024 20:30  Patient On (Oxygen Delivery Method): room air  Well-appearing woman, laying comfortably in bed, nad  Anicteric sclera; no oral lesions/thrush  RRR, no m/r/g  CTAB, no w/c/r  Abd soft/nd/nt, normoactive bowel sounds  No peripheral edema  CN 2-1 grossly intact; no gross focal neuro deficits

## 2024-04-02 NOTE — H&P ADULT - HISTORY OF PRESENT ILLNESS
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 Pt is directly admitted for C3 Carboplatin with desensitization procotol. She reports feeling well and has no complaints currently.

## 2024-04-02 NOTE — H&P ADULT - REASON FOR ADMISSION
Elective admission for chemotherapy, including Carboplatin desensitization Elective admission for chemotherapy: Carboplatin desensitization

## 2024-04-03 ENCOUNTER — TRANSCRIPTION ENCOUNTER (OUTPATIENT)
Age: 59
End: 2024-04-03

## 2024-04-03 VITALS
HEART RATE: 87 BPM | OXYGEN SATURATION: 99 % | TEMPERATURE: 98 F | SYSTOLIC BLOOD PRESSURE: 132 MMHG | RESPIRATION RATE: 17 BRPM | DIASTOLIC BLOOD PRESSURE: 95 MMHG

## 2024-04-03 LAB
ALBUMIN SERPL ELPH-MCNC: 4 G/DL — SIGNIFICANT CHANGE UP (ref 3.3–5)
ALP SERPL-CCNC: 130 U/L — HIGH (ref 40–120)
ALT FLD-CCNC: 27 U/L — SIGNIFICANT CHANGE UP (ref 4–33)
ANION GAP SERPL CALC-SCNC: 12 MMOL/L — SIGNIFICANT CHANGE UP (ref 7–14)
AST SERPL-CCNC: 19 U/L — SIGNIFICANT CHANGE UP (ref 4–32)
BASOPHILS # BLD AUTO: 0.01 K/UL — SIGNIFICANT CHANGE UP (ref 0–0.2)
BASOPHILS NFR BLD AUTO: 0.1 % — SIGNIFICANT CHANGE UP (ref 0–2)
BILIRUB SERPL-MCNC: 0.3 MG/DL — SIGNIFICANT CHANGE UP (ref 0.2–1.2)
BUN SERPL-MCNC: 13 MG/DL — SIGNIFICANT CHANGE UP (ref 7–23)
CALCIUM SERPL-MCNC: 10 MG/DL — SIGNIFICANT CHANGE UP (ref 8.4–10.5)
CHLORIDE SERPL-SCNC: 105 MMOL/L — SIGNIFICANT CHANGE UP (ref 98–107)
CO2 SERPL-SCNC: 22 MMOL/L — SIGNIFICANT CHANGE UP (ref 22–31)
CREAT SERPL-MCNC: 0.58 MG/DL — SIGNIFICANT CHANGE UP (ref 0.5–1.3)
EGFR: 105 ML/MIN/1.73M2 — SIGNIFICANT CHANGE UP
EOSINOPHIL # BLD AUTO: 0 K/UL — SIGNIFICANT CHANGE UP (ref 0–0.5)
EOSINOPHIL NFR BLD AUTO: 0 % — SIGNIFICANT CHANGE UP (ref 0–6)
GLUCOSE SERPL-MCNC: 128 MG/DL — HIGH (ref 70–99)
HCT VFR BLD CALC: 34.3 % — LOW (ref 34.5–45)
HGB BLD-MCNC: 11.5 G/DL — SIGNIFICANT CHANGE UP (ref 11.5–15.5)
IANC: 6.69 K/UL — SIGNIFICANT CHANGE UP (ref 1.8–7.4)
IMM GRANULOCYTES NFR BLD AUTO: 0.8 % — SIGNIFICANT CHANGE UP (ref 0–0.9)
LYMPHOCYTES # BLD AUTO: 0.78 K/UL — LOW (ref 1–3.3)
LYMPHOCYTES # BLD AUTO: 9.8 % — LOW (ref 13–44)
MAGNESIUM SERPL-MCNC: 1.7 MG/DL — SIGNIFICANT CHANGE UP (ref 1.6–2.6)
MCHC RBC-ENTMCNC: 29.9 PG — SIGNIFICANT CHANGE UP (ref 27–34)
MCHC RBC-ENTMCNC: 33.5 GM/DL — SIGNIFICANT CHANGE UP (ref 32–36)
MCV RBC AUTO: 89.3 FL — SIGNIFICANT CHANGE UP (ref 80–100)
MONOCYTES # BLD AUTO: 0.43 K/UL — SIGNIFICANT CHANGE UP (ref 0–0.9)
MONOCYTES NFR BLD AUTO: 5.4 % — SIGNIFICANT CHANGE UP (ref 2–14)
MRSA PCR RESULT.: SIGNIFICANT CHANGE UP
NEUTROPHILS # BLD AUTO: 6.69 K/UL — SIGNIFICANT CHANGE UP (ref 1.8–7.4)
NEUTROPHILS NFR BLD AUTO: 83.9 % — HIGH (ref 43–77)
NRBC # BLD: 0 /100 WBCS — SIGNIFICANT CHANGE UP (ref 0–0)
NRBC # FLD: 0 K/UL — SIGNIFICANT CHANGE UP (ref 0–0)
PHOSPHATE SERPL-MCNC: 3.7 MG/DL — SIGNIFICANT CHANGE UP (ref 2.5–4.5)
PLATELET # BLD AUTO: 252 K/UL — SIGNIFICANT CHANGE UP (ref 150–400)
POTASSIUM SERPL-MCNC: 4.1 MMOL/L — SIGNIFICANT CHANGE UP (ref 3.5–5.3)
POTASSIUM SERPL-SCNC: 4.1 MMOL/L — SIGNIFICANT CHANGE UP (ref 3.5–5.3)
PROT SERPL-MCNC: 8.3 G/DL — SIGNIFICANT CHANGE UP (ref 6–8.3)
RBC # BLD: 3.84 M/UL — SIGNIFICANT CHANGE UP (ref 3.8–5.2)
RBC # FLD: 13.2 % — SIGNIFICANT CHANGE UP (ref 10.3–14.5)
S AUREUS DNA NOSE QL NAA+PROBE: SIGNIFICANT CHANGE UP
SODIUM SERPL-SCNC: 139 MMOL/L — SIGNIFICANT CHANGE UP (ref 135–145)
WBC # BLD: 7.97 K/UL — SIGNIFICANT CHANGE UP (ref 3.8–10.5)
WBC # FLD AUTO: 7.97 K/UL — SIGNIFICANT CHANGE UP (ref 3.8–10.5)

## 2024-04-03 PROCEDURE — 99239 HOSP IP/OBS DSCHRG MGMT >30: CPT

## 2024-04-03 RX ORDER — CHLORHEXIDINE GLUCONATE 213 G/1000ML
1 SOLUTION TOPICAL DAILY
Refills: 0 | Status: DISCONTINUED | OUTPATIENT
Start: 2024-04-03 | End: 2024-04-03

## 2024-04-03 RX ADMIN — CHLORHEXIDINE GLUCONATE 1 APPLICATION(S): 213 SOLUTION TOPICAL at 13:20

## 2024-04-03 NOTE — DISCHARGE NOTE PROVIDER - NSDCCPCAREPLAN_GEN_ALL_CORE_FT
PRINCIPAL DISCHARGE DIAGNOSIS  Diagnosis: Encounter for antineoplastic chemotherapy  Assessment and Plan of Treatment: You were admitted for carboplatin desensitization due to your reaction to the chemotherapy.  You had developed rash in your hands, arms and face, which improved after zytrec and benadryl.  You tolerated the reminder of the chemotherapy well.      SECONDARY DISCHARGE DIAGNOSES  Diagnosis: Ovarian carcinoma  Assessment and Plan of Treatment: Please follow up with Dr. Au after discharge

## 2024-04-03 NOTE — DISCHARGE NOTE PROVIDER - ATTENDING DISCHARGE PHYSICAL EXAMINATION:
Well-appearing woman, laying comfortably in bed, nad  Anicteric sclera; no oral lesions/thrush  RRR, no m/r/g  CTAB, no w/c/r  Abd soft/nd/nt, normoactive bowel sounds  No peripheral edema  CN 2-1 grossly intact; no gross focal neuro deficits

## 2024-04-03 NOTE — DISCHARGE NOTE PROVIDER - NSDCFUSCHEDAPPT_GEN_ALL_CORE_FT
Guillermina Au  [facility]  Luis Mullen  Scheduled Appointment: 04/16/2024

## 2024-04-03 NOTE — DISCHARGE NOTE NURSING/CASE MANAGEMENT/SOCIAL WORK - PATIENT PORTAL LINK FT
You can access the FollowMyHealth Patient Portal offered by [facility] by registering at the following website: [email or web address]. By joining [facility]’s FollowMyHealth portal, you will also be able to view your health information using other applications (apps) compatible with our system.

## 2024-04-03 NOTE — DISCHARGE NOTE PROVIDER - HOSPITAL COURSE
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  No swelling noted.  Additional Zytrec and Benadryl given per Allergy with improvement of rash.  Continue infusion w/ longer interval  in between, tolerated well.  - Overall plan per Dr. Au

## 2024-04-09 ENCOUNTER — APPOINTMENT (OUTPATIENT)
Dept: PEDIATRIC ALLERGY IMMUNOLOGY | Facility: CLINIC | Age: 59
End: 2024-04-09
Payer: MEDICAID

## 2024-04-09 DIAGNOSIS — T50.905A ALLERGIC URTICARIA: ICD-10-CM

## 2024-04-09 DIAGNOSIS — T45.1X5D ADVERSE EFFECT OF ANTINEOPLASTIC AND IMMUNOSUPPRESSIVE DRUGS, SUBSEQUENT ENCOUNTER: ICD-10-CM

## 2024-04-09 DIAGNOSIS — T45.1X5A ADVERSE EFFECT OF ANTINEOPLASTIC AND IMMUNOSUPPRESSIVE DRUGS, INITIAL ENCOUNTER: ICD-10-CM

## 2024-04-09 DIAGNOSIS — L50.0 ALLERGIC URTICARIA: ICD-10-CM

## 2024-04-09 PROCEDURE — 99442: CPT

## 2024-04-15 ENCOUNTER — NON-APPOINTMENT (OUTPATIENT)
Age: 59
End: 2024-04-15

## 2024-04-16 ENCOUNTER — APPOINTMENT (OUTPATIENT)
Dept: HEMATOLOGY ONCOLOGY | Facility: CLINIC | Age: 59
End: 2024-04-16
Payer: MEDICAID

## 2024-04-16 ENCOUNTER — RESULT REVIEW (OUTPATIENT)
Age: 59
End: 2024-04-16

## 2024-04-16 VITALS
HEART RATE: 95 BPM | OXYGEN SATURATION: 97 % | SYSTOLIC BLOOD PRESSURE: 128 MMHG | TEMPERATURE: 97.3 F | WEIGHT: 175.25 LBS | BODY MASS INDEX: 29.17 KG/M2 | RESPIRATION RATE: 16 BRPM | DIASTOLIC BLOOD PRESSURE: 84 MMHG

## 2024-04-16 LAB
BASOPHILS # BLD AUTO: 0.01 K/UL — SIGNIFICANT CHANGE UP (ref 0–0.2)
BASOPHILS NFR BLD AUTO: 0.2 % — SIGNIFICANT CHANGE UP (ref 0–2)
EOSINOPHIL # BLD AUTO: 0.07 K/UL — SIGNIFICANT CHANGE UP (ref 0–0.5)
EOSINOPHIL NFR BLD AUTO: 1.1 % — SIGNIFICANT CHANGE UP (ref 0–6)
HCT VFR BLD CALC: 32.3 % — LOW (ref 34.5–45)
HGB BLD-MCNC: 10.7 G/DL — LOW (ref 11.5–15.5)
IMM GRANULOCYTES NFR BLD AUTO: 0.6 % — SIGNIFICANT CHANGE UP (ref 0–0.9)
LYMPHOCYTES # BLD AUTO: 0.77 K/UL — LOW (ref 1–3.3)
LYMPHOCYTES # BLD AUTO: 12.2 % — LOW (ref 13–44)
MCHC RBC-ENTMCNC: 30.9 PG — SIGNIFICANT CHANGE UP (ref 27–34)
MCHC RBC-ENTMCNC: 33.1 G/DL — SIGNIFICANT CHANGE UP (ref 32–36)
MCV RBC AUTO: 93.4 FL — SIGNIFICANT CHANGE UP (ref 80–100)
MONOCYTES # BLD AUTO: 0.49 K/UL — SIGNIFICANT CHANGE UP (ref 0–0.9)
MONOCYTES NFR BLD AUTO: 7.8 % — SIGNIFICANT CHANGE UP (ref 2–14)
NEUTROPHILS # BLD AUTO: 4.94 K/UL — SIGNIFICANT CHANGE UP (ref 1.8–7.4)
NEUTROPHILS NFR BLD AUTO: 78.1 % — HIGH (ref 43–77)
NRBC # BLD: 0 /100 WBCS — SIGNIFICANT CHANGE UP (ref 0–0)
PLATELET # BLD AUTO: 197 K/UL — SIGNIFICANT CHANGE UP (ref 150–400)
RBC # BLD: 3.46 M/UL — LOW (ref 3.8–5.2)
RBC # FLD: 13.3 % — SIGNIFICANT CHANGE UP (ref 10.3–14.5)
WBC # BLD: 6.32 K/UL — SIGNIFICANT CHANGE UP (ref 3.8–10.5)
WBC # FLD AUTO: 6.32 K/UL — SIGNIFICANT CHANGE UP (ref 3.8–10.5)

## 2024-04-16 PROCEDURE — 99213 OFFICE O/P EST LOW 20 MIN: CPT

## 2024-04-16 NOTE — REVIEW OF SYSTEMS
[Fatigue] : fatigue [Recent Change In Weight] : ~T recent weight change [Muscle Pain] : muscle pain [Negative] : Allergic/Immunologic [Fever] : no fever [Dizziness] : no dizziness [Difficulty Walking] : no difficulty walking [FreeTextEntry7] : nausea/vomiting for 2-3 days after tx [de-identified] : peripheral neuropathy of feet

## 2024-04-16 NOTE — HISTORY OF PRESENT ILLNESS
[Disease: _____________________] : Disease: [unfilled] [T: ___] : T[unfilled] [N: ___] : N[unfilled] [M: ___] : M[unfilled] [AJCC Stage: ____] : AJCC Stage: [unfilled] [de-identified] : 56 yo F with a PMH of HTN, fatty liver disease with gallstones, PE (2018), ventral hernia (not repaired), and who presents for management of high grade serous ovarian carcinoma.  She initially had abdominal pain in late 2017 with elevated CA-125, found to have b/l adnexal masses; did not have gross metastatic disease at that time. She had a SUAD-BSO, omentectomy, appendectomy, and tumor debulking 1/29/18, and was found to have high grade serous ovarian carcinoma (TMB 4 Muts/Mb, no targetable mutations, BRCA negative, Ki67 > 90%), PDL1 < 1%, [id number] disease; underwent 6 cycles of Carbo/Taxol 03-06/2018 (done at [facility]) Found to have worsening abdominal and particularly R pelvic LAD on 11/2018. She was then seen by Dr. Maher. Started Doxil/Avastin, 4 cycles 12/2018 - 03/2019 Topotecan/Avastin, 03/2019 - 08/2019, followed by pelvic LN IMRT/IGRT x 5 fx Abraxane 01/2020, followed by 5 fx of RT (06/2020), improved periportal, mesenteric, paracolic, and near resolution of L external iliac LAD, with improved R external iliac LAD. Restarted Abraxane 06/2020. Increase in L external iliac LN 05/2021, agreed to radiate 5 fx every other day and hold off change in therapy, c/b diarrhea. Resumed Abraxane after (c/b neuropathy) Interval progression of disease in November 2021 with R adrenal gland nodule and increasing L external iliac and mesenteric LAD. Started on Niraparib 12/9/21. CT 5/2022 showed an increase in the size of her R adrenal LN and mesenteric LAD with new R retrocaval LAD. S/p adrenal bx 5/25/22 confirming metastatic ovarian carcinoma, Ki67 90%. Foundation showed MSI-S disease, TMB 4 Muts/Mb, ALANA 13.5%, AKT2 amplification, CCNE1 amplification, PIK3CB amplification, TP53 C277*, CIC rearrangement exon 15, MYCL1 amplification, and NFKBIA amplification but no targetable mutations. In 06/2022, she started single agent Gemcitabine on days 1, 8 of 21 day cycle. CT 09/2022 showed overall POD with enlarging R adrenal nodule and RP LAD with slightly decreased mesenteric LAD. She was planned to start Alimta, but referred to Dr. uA to evaluate for possible clinical trial.  She lost about 15 pounds since the Gemcitabine treatment but has gained 4 of it back. Prior to Gemcitabine, her weight was stable. Currently c/o constipation, no blood in stool. No N/V, CP, cough. She thinks she had an allergic reaction (dyspnea, flushing) to her 3rd chemotherapy, but was improved after infusing slowly. No reaction to Carbo/Taxol. She had myalgias constantly since starting chemotherapy, but improved with Tylenol. Also has a moderate headache (without blurry vision) that improves  She had a PE when originally diagnosed in 2018, as well as in 2022 in the pelvis.  Family Hx of prostate cancer (father, age 64) and maternal aunt (breast cancer, young age). No smoking, alcohol, or drug use history. Lives with her  and adult children. Able to function but usually has to stop housework after about 20-30 minutes, takes 5 minute break, and then restart. She does not go outside much because she has pain from her ventral hernia (she has not had it repaired due to cancer).  Since her last visit with us in 2022 she received Alimta starting 9/30/22. Found to have POD in December 2022. She was started on Letrozole, FOLR1 testing was done concurrently and found to be negative. She also recieved palliative RT to adrenal met in February 2023. She was found to have POD in April 2023 and treatment was changed to taxol and Avastin starting 5/4/23.  She was most recently started on treatment with retreat Carboplatin in January 2024 but had reaction about 10 min into 2nd cycle on 2/14/24 (treated outpatient, did not need hospital transfer) She is here today to discuss possible treatment with Carboplatin via desensitization. [de-identified] : High Grade Serous Ovarian Carcinoma [de-identified] : [place] Carboplatin( weekly AUC-2)- 1/12/24- 1/26/24- HSR on week 2 Paclitaxel/ Anastasiia- 5/4/2023- 12/29/23- 9 cycles Letrozole- 1/6/2023- 4/28/2023 Alimta 9/30/22- 1/6/2023- 4 cycles Gemcitabine - 6/9/2022- 9/15/2022 - four cycles Niraparib- 12/9/21- 4/28/22 Abraxane 1/23/2020- 12/3/2021- six cycles Topotecan/ Avastin- 4/2/19- 7/30/19- four cycles Doxil/ Avastin- 12/4/18- 3/28/19- four cycles. [FreeTextEntry1] : [place] Carbo (desensitization)  C1 - 4/2/24 [de-identified] : Patient is here for follow up after her first treatment at [facility]. She did have a mild reaction during the desensitization protocol but was able to complete the treatment. She had nausea and vomiting starting Saturday for 2-3 days, said she did not take Zofran very often but it did help when she took it. Appetite was decreased but is much better, eating well. She had one day of diarrhea, otherwise normal BMs. She denies any pain. 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

## 2024-04-17 ENCOUNTER — NON-APPOINTMENT (OUTPATIENT)
Age: 59
End: 2024-04-17

## 2024-04-17 LAB
ALBUMIN SERPL ELPH-MCNC: 4.3 G/DL
ALP BLD-CCNC: 142 U/L
ALT SERPL-CCNC: 29 U/L
ANION GAP SERPL CALC-SCNC: 14 MMOL/L
AST SERPL-CCNC: 29 U/L
BILIRUB SERPL-MCNC: 0.4 MG/DL
BUN SERPL-MCNC: 17 MG/DL
CALCIUM SERPL-MCNC: 10 MG/DL
CANCER AG125 SERPL-ACNC: 9 U/ML
CHLORIDE SERPL-SCNC: 107 MMOL/L
CO2 SERPL-SCNC: 22 MMOL/L
CREAT SERPL-MCNC: 0.75 MG/DL
EGFR: 92 ML/MIN/1.73M2
GLUCOSE SERPL-MCNC: 113 MG/DL
MAGNESIUM SERPL-MCNC: 1.6 MG/DL
POTASSIUM SERPL-SCNC: 4.5 MMOL/L
PROT SERPL-MCNC: 8.1 G/DL
SODIUM SERPL-SCNC: 143 MMOL/L

## 2024-04-23 ENCOUNTER — INPATIENT (INPATIENT)
Facility: HOSPITAL | Age: 59
LOS: 0 days | Discharge: HOME | End: 2024-04-24
Attending: INTERNAL MEDICINE | Admitting: INTERNAL MEDICINE
Payer: MEDICAID

## 2024-04-23 VITALS
OXYGEN SATURATION: 100 % | SYSTOLIC BLOOD PRESSURE: 123 MMHG | HEART RATE: 77 BPM | WEIGHT: 173.28 LBS | RESPIRATION RATE: 18 BRPM | DIASTOLIC BLOOD PRESSURE: 82 MMHG | TEMPERATURE: 98 F | HEIGHT: 65 IN

## 2024-04-23 DIAGNOSIS — C56.9 MALIGNANT NEOPLASM OF UNSPECIFIED OVARY: ICD-10-CM

## 2024-04-23 LAB
ALBUMIN SERPL ELPH-MCNC: 4.1 G/DL — SIGNIFICANT CHANGE UP (ref 3.3–5)
ALP SERPL-CCNC: 128 U/L — HIGH (ref 40–120)
ALT FLD-CCNC: 22 U/L — SIGNIFICANT CHANGE UP (ref 4–33)
ANION GAP SERPL CALC-SCNC: 15 MMOL/L — HIGH (ref 7–14)
AST SERPL-CCNC: 20 U/L — SIGNIFICANT CHANGE UP (ref 4–32)
BASOPHILS # BLD AUTO: 0.02 K/UL — SIGNIFICANT CHANGE UP (ref 0–0.2)
BASOPHILS NFR BLD AUTO: 0.4 % — SIGNIFICANT CHANGE UP (ref 0–2)
BILIRUB SERPL-MCNC: 0.3 MG/DL — SIGNIFICANT CHANGE UP (ref 0.2–1.2)
BUN SERPL-MCNC: 18 MG/DL — SIGNIFICANT CHANGE UP (ref 7–23)
CALCIUM SERPL-MCNC: 9.8 MG/DL — SIGNIFICANT CHANGE UP (ref 8.4–10.5)
CHLORIDE SERPL-SCNC: 106 MMOL/L — SIGNIFICANT CHANGE UP (ref 98–107)
CO2 SERPL-SCNC: 20 MMOL/L — LOW (ref 22–31)
CREAT SERPL-MCNC: 0.76 MG/DL — SIGNIFICANT CHANGE UP (ref 0.5–1.3)
EGFR: 91 ML/MIN/1.73M2 — SIGNIFICANT CHANGE UP
EOSINOPHIL # BLD AUTO: 0.1 K/UL — SIGNIFICANT CHANGE UP (ref 0–0.5)
EOSINOPHIL NFR BLD AUTO: 1.8 % — SIGNIFICANT CHANGE UP (ref 0–6)
GLUCOSE SERPL-MCNC: 97 MG/DL — SIGNIFICANT CHANGE UP (ref 70–99)
HCT VFR BLD CALC: 31.6 % — LOW (ref 34.5–45)
HGB BLD-MCNC: 10.4 G/DL — LOW (ref 11.5–15.5)
IANC: 3.7 K/UL — SIGNIFICANT CHANGE UP (ref 1.8–7.4)
IMM GRANULOCYTES NFR BLD AUTO: 0.5 % — SIGNIFICANT CHANGE UP (ref 0–0.9)
LYMPHOCYTES # BLD AUTO: 1.09 K/UL — SIGNIFICANT CHANGE UP (ref 1–3.3)
LYMPHOCYTES # BLD AUTO: 19.6 % — SIGNIFICANT CHANGE UP (ref 13–44)
MAGNESIUM SERPL-MCNC: 1.6 MG/DL — SIGNIFICANT CHANGE UP (ref 1.6–2.6)
MCHC RBC-ENTMCNC: 29.5 PG — SIGNIFICANT CHANGE UP (ref 27–34)
MCHC RBC-ENTMCNC: 32.9 GM/DL — SIGNIFICANT CHANGE UP (ref 32–36)
MCV RBC AUTO: 89.5 FL — SIGNIFICANT CHANGE UP (ref 80–100)
MONOCYTES # BLD AUTO: 0.62 K/UL — SIGNIFICANT CHANGE UP (ref 0–0.9)
MONOCYTES NFR BLD AUTO: 11.2 % — SIGNIFICANT CHANGE UP (ref 2–14)
NEUTROPHILS # BLD AUTO: 3.7 K/UL — SIGNIFICANT CHANGE UP (ref 1.8–7.4)
NEUTROPHILS NFR BLD AUTO: 66.5 % — SIGNIFICANT CHANGE UP (ref 43–77)
NRBC # BLD: 0 /100 WBCS — SIGNIFICANT CHANGE UP (ref 0–0)
NRBC # FLD: 0 K/UL — SIGNIFICANT CHANGE UP (ref 0–0)
PHOSPHATE SERPL-MCNC: 4.3 MG/DL — SIGNIFICANT CHANGE UP (ref 2.5–4.5)
PLATELET # BLD AUTO: 179 K/UL — SIGNIFICANT CHANGE UP (ref 150–400)
POTASSIUM SERPL-MCNC: 4 MMOL/L — SIGNIFICANT CHANGE UP (ref 3.5–5.3)
POTASSIUM SERPL-SCNC: 4 MMOL/L — SIGNIFICANT CHANGE UP (ref 3.5–5.3)
PROT SERPL-MCNC: 8.1 G/DL — SIGNIFICANT CHANGE UP (ref 6–8.3)
RBC # BLD: 3.53 M/UL — LOW (ref 3.8–5.2)
RBC # FLD: 14.2 % — SIGNIFICANT CHANGE UP (ref 10.3–14.5)
SODIUM SERPL-SCNC: 141 MMOL/L — SIGNIFICANT CHANGE UP (ref 135–145)
WBC # BLD: 5.56 K/UL — SIGNIFICANT CHANGE UP (ref 3.8–10.5)
WBC # FLD AUTO: 5.56 K/UL — SIGNIFICANT CHANGE UP (ref 3.8–10.5)

## 2024-04-23 PROCEDURE — 99222 1ST HOSP IP/OBS MODERATE 55: CPT

## 2024-04-23 RX ORDER — EPINEPHRINE 0.3 MG/.3ML
0.3 INJECTION INTRAMUSCULAR; SUBCUTANEOUS ONCE
Refills: 0 | Status: DISCONTINUED | OUTPATIENT
Start: 2024-04-23 | End: 2024-04-24

## 2024-04-23 RX ORDER — SODIUM CHLORIDE 9 MG/ML
1000 INJECTION INTRAMUSCULAR; INTRAVENOUS; SUBCUTANEOUS
Refills: 0 | Status: DISCONTINUED | OUTPATIENT
Start: 2024-04-23 | End: 2024-04-24

## 2024-04-23 RX ORDER — ONDANSETRON 8 MG/1
16 TABLET, FILM COATED ORAL ONCE
Refills: 0 | Status: COMPLETED | OUTPATIENT
Start: 2024-04-23 | End: 2024-04-23

## 2024-04-23 RX ORDER — ALBUTEROL 90 UG/1
2.5 AEROSOL, METERED ORAL ONCE
Refills: 0 | Status: DISCONTINUED | OUTPATIENT
Start: 2024-04-23 | End: 2024-04-24

## 2024-04-23 RX ORDER — DEXAMETHASONE 0.5 MG/5ML
8 ELIXIR ORAL ONCE
Refills: 0 | Status: COMPLETED | OUTPATIENT
Start: 2024-04-23 | End: 2024-04-23

## 2024-04-23 RX ORDER — FAMOTIDINE 10 MG/ML
20 INJECTION INTRAVENOUS ONCE
Refills: 0 | Status: DISCONTINUED | OUTPATIENT
Start: 2024-04-23 | End: 2024-04-23

## 2024-04-23 RX ORDER — DIPHENHYDRAMINE HCL 50 MG
50 CAPSULE ORAL ONCE
Refills: 0 | Status: DISCONTINUED | OUTPATIENT
Start: 2024-04-23 | End: 2024-04-24

## 2024-04-23 RX ORDER — CETIRIZINE HYDROCHLORIDE 10 MG/1
10 TABLET ORAL ONCE
Refills: 0 | Status: COMPLETED | OUTPATIENT
Start: 2024-04-23 | End: 2024-04-23

## 2024-04-23 RX ORDER — GLUCAGON INJECTION, SOLUTION 0.5 MG/.1ML
1 INJECTION, SOLUTION SUBCUTANEOUS ONCE
Refills: 0 | Status: DISCONTINUED | OUTPATIENT
Start: 2024-04-23 | End: 2024-04-24

## 2024-04-23 RX ORDER — ALBUTEROL 90 UG/1
1 AEROSOL, METERED ORAL ONCE
Refills: 0 | Status: DISCONTINUED | OUTPATIENT
Start: 2024-04-23 | End: 2024-04-24

## 2024-04-23 RX ORDER — DIPHENHYDRAMINE HCL 50 MG
50 CAPSULE ORAL ONCE
Refills: 0 | Status: COMPLETED | OUTPATIENT
Start: 2024-04-23 | End: 2024-04-23

## 2024-04-23 RX ORDER — FAMOTIDINE 10 MG/ML
20 INJECTION INTRAVENOUS DAILY
Refills: 0 | Status: DISCONTINUED | OUTPATIENT
Start: 2024-04-23 | End: 2024-04-24

## 2024-04-23 RX ORDER — MONTELUKAST 4 MG/1
10 TABLET, CHEWABLE ORAL ONCE
Refills: 0 | Status: COMPLETED | OUTPATIENT
Start: 2024-04-23 | End: 2024-04-23

## 2024-04-23 RX ORDER — FAMOTIDINE 10 MG/ML
20 INJECTION INTRAVENOUS ONCE
Refills: 0 | Status: DISCONTINUED | OUTPATIENT
Start: 2024-04-23 | End: 2024-04-24

## 2024-04-23 RX ORDER — GLUCAGON INJECTION, SOLUTION 0.5 MG/.1ML
2 INJECTION, SOLUTION SUBCUTANEOUS ONCE
Refills: 0 | Status: DISCONTINUED | OUTPATIENT
Start: 2024-04-23 | End: 2024-04-24

## 2024-04-23 RX ORDER — INFLUENZA VIRUS VACCINE 15; 15; 15; 15 UG/.5ML; UG/.5ML; UG/.5ML; UG/.5ML
0.5 SUSPENSION INTRAMUSCULAR ONCE
Refills: 0 | Status: DISCONTINUED | OUTPATIENT
Start: 2024-04-23 | End: 2024-04-24

## 2024-04-23 RX ORDER — METOCLOPRAMIDE HCL 10 MG
10 TABLET ORAL ONCE
Refills: 0 | Status: COMPLETED | OUTPATIENT
Start: 2024-04-23 | End: 2024-04-23

## 2024-04-23 RX ORDER — CARBOPLATIN 50 MG
590 VIAL (EA) INTRAVENOUS ONCE
Refills: 0 | Status: COMPLETED | OUTPATIENT
Start: 2024-04-23 | End: 2024-04-23

## 2024-04-23 RX ADMIN — FAMOTIDINE 20 MILLIGRAM(S): 10 INJECTION INTRAVENOUS at 10:17

## 2024-04-23 RX ADMIN — CETIRIZINE HYDROCHLORIDE 10 MILLIGRAM(S): 10 TABLET ORAL at 13:43

## 2024-04-23 RX ADMIN — ONDANSETRON 116 MILLIGRAM(S): 8 TABLET, FILM COATED ORAL at 10:44

## 2024-04-23 RX ADMIN — Medication 590 MILLIGRAM(S): at 11:34

## 2024-04-23 RX ADMIN — SODIUM CHLORIDE 250 MILLILITER(S): 9 INJECTION INTRAMUSCULAR; INTRAVENOUS; SUBCUTANEOUS at 11:20

## 2024-04-23 RX ADMIN — MONTELUKAST 10 MILLIGRAM(S): 4 TABLET, CHEWABLE ORAL at 10:11

## 2024-04-23 RX ADMIN — CETIRIZINE HYDROCHLORIDE 10 MILLIGRAM(S): 10 TABLET ORAL at 10:10

## 2024-04-23 RX ADMIN — Medication 10 MILLIGRAM(S): at 10:11

## 2024-04-23 RX ADMIN — Medication 101.6 MILLIGRAM(S): at 10:14

## 2024-04-23 RX ADMIN — Medication 1 MILLIGRAM(S): at 11:08

## 2024-04-23 RX ADMIN — Medication 50 MILLIGRAM(S): at 13:44

## 2024-04-23 NOTE — H&P ADULT - NSHPPHYSICALEXAM_GEN_ALL_CORE
Well-appearing woman, laying comfortably in bed, nad  Anicteric sclera; no oral lesions/thrush  RRR, no m/r/g  CTAB, no w/c/r  Abd soft/nd/nt, normoactive bowel sounds  No peripheral edema  CN 2-1 grossly intact; no gross focal neuro deficits VS pending.  Well-appearing woman, laying comfortably in bed, nad  Anicteric sclera; no oral lesions/thrush  RRR, no m/r/g  CTAB, no w/c/r  Abd soft/nd/nt, normoactive bowel sounds  No peripheral edema  CN 2-1 grossly intact; no gross focal neuro deficits

## 2024-04-23 NOTE — H&P ADULT - ASSESSMENT
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 Pt is directly admitted for C4 Carboplatin with desensitization procotol.    ACTIVE PROBLEMS  Met ovarian ca  Admission for chemotherapy  Desensitization to Allergen    - Proceeding with desensitization protocol as planned    - Monitoring for signs of hypersensitivity reaction/anaphylaxis; will tx as per protocol recommended by Allergy    - Pt to continue outpt fu with Dr. Au after discharge    - GI/DVT ppx: diet/ambulation 58 yo woman with h/o HTN, fatty liver w/ gallstones, PE (2018), ventral hernia (not repaired), and Stage Toshia high grade serous ovarian carcinoma, progressed through multiple lines of cancer-directed therapies, most recently on Carboplatin retreatment- tx course c/b hypersensitivity reaction after C2 (hives/pruritis, generalized erythema of skin). Pt is directly admitted for C4 Carboplatin with desensitization procotol.    ACTIVE PROBLEMS  Met ovarian ca  Admission for chemotherapy  Desensitization to Allergen    - Proceeding with desensitization protocol as planned    - Monitoring for signs of hypersensitivity reaction/anaphylaxis; will tx as per protocol recommended by Allergy    - Pt to continue outpt fu with Dr. Au after discharge    - GI/DVT ppx: diet/ambulation

## 2024-04-23 NOTE — PATIENT PROFILE ADULT - FALL HARM RISK - PATIENT NEEDS ASSISTANCE
Quality 111:Pneumonia Vaccination Status For Older Adults: Pneumococcal Vaccination Previously Received Detail Level: Detailed Quality 131: Pain Assessment And Follow-Up: Pain assessment using a standardized tool is documented as negative, no follow-up plan required Quality 110: Preventive Care And Screening: Influenza Immunization: Influenza Immunization Administered during Influenza season No assistance needed

## 2024-04-23 NOTE — PROVIDER CONTACT NOTE (MEDICATION) - SITUATION
Pt receiving carboplatin desensitization, on step 7 pt started experiencing itchy palms, redness around the nose and b/l ears

## 2024-04-23 NOTE — PROVIDER CONTACT NOTE (MEDICATION) - ACTION/TREATMENT ORDERED:
JULIUS Pina present to the bedside, give the Zyrtec ordered for step 9 now, give the reaction medication Benadryl 50mg IVP now, hold chemotherapy until reaction subsides JULIUS Pina present to the bedside, give the Zyrtec ordered for step 9 now, give the reaction medication Benadryl 50mg IVP now, hold chemotherapy until reaction subsides, will restart back at step 6

## 2024-04-23 NOTE — H&P ADULT - HISTORY OF PRESENT ILLNESS
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 Pt is directly admitted for C4 Carboplatin with desensitization procotol. She reports feeling well and has no complaints currently. 60 yo woman with h/o HTN, fatty liver w/ gallstones, PE (2018), ventral hernia (not repaired), and Stage Toshia high grade serous ovarian carcinoma, progressed through multiple lines of cancer-directed therapies, most recently on Carboplatin retreatment- tx course c/b hypersensitivity reaction after C2 (hives/pruritis, generalized erythema of skin). Pt is directly admitted for C4 Carboplatin with desensitization procotol. She reports feeling well and has no complaints currently.

## 2024-04-23 NOTE — PATIENT PROFILE ADULT - FALL HARM RISK - HARM RISK INTERVENTIONS

## 2024-04-23 NOTE — PROVIDER CONTACT NOTE (OTHER) - ASSESSMENT
Pt instructed to hold BP meds prior to chemotherapy today, no symptoms, pt reports she feels good post treatment,

## 2024-04-23 NOTE — PROVIDER CONTACT NOTE (MEDICATION) - ASSESSMENT
Pt in bed, safety maintained, denies any shortness of breath, palms of b/l hands with redness and itching, b/l ears red, redness around the nose, chemotherapy paused, VS taken all within normal limits except heart rate of 112

## 2024-04-23 NOTE — H&P ADULT - TIME BILLING
30 mins-Chart and previous hospitalizations reviewed as well as labs, vitals prior to patient being seen for approximately   10 mins-Meds reviewed and prior outpatient workup   20 mins-Patient seen and examined and plan discussed, all questions were answered to the best of my ability  15 mins-Charting/documentation and orders.

## 2024-04-24 ENCOUNTER — TRANSCRIPTION ENCOUNTER (OUTPATIENT)
Age: 59
End: 2024-04-24

## 2024-04-24 VITALS
DIASTOLIC BLOOD PRESSURE: 83 MMHG | RESPIRATION RATE: 20 BRPM | SYSTOLIC BLOOD PRESSURE: 136 MMHG | OXYGEN SATURATION: 99 % | TEMPERATURE: 98 F | HEART RATE: 90 BPM

## 2024-04-24 LAB
ALBUMIN SERPL ELPH-MCNC: 3.8 G/DL — SIGNIFICANT CHANGE UP (ref 3.3–5)
ALP SERPL-CCNC: 119 U/L — SIGNIFICANT CHANGE UP (ref 40–120)
ALT FLD-CCNC: 20 U/L — SIGNIFICANT CHANGE UP (ref 4–33)
ANION GAP SERPL CALC-SCNC: 16 MMOL/L — HIGH (ref 7–14)
AST SERPL-CCNC: 19 U/L — SIGNIFICANT CHANGE UP (ref 4–32)
BASOPHILS # BLD AUTO: 0.01 K/UL — SIGNIFICANT CHANGE UP (ref 0–0.2)
BASOPHILS NFR BLD AUTO: 0.2 % — SIGNIFICANT CHANGE UP (ref 0–2)
BILIRUB SERPL-MCNC: 0.4 MG/DL — SIGNIFICANT CHANGE UP (ref 0.2–1.2)
BUN SERPL-MCNC: 13 MG/DL — SIGNIFICANT CHANGE UP (ref 7–23)
CALCIUM SERPL-MCNC: 9.5 MG/DL — SIGNIFICANT CHANGE UP (ref 8.4–10.5)
CHLORIDE SERPL-SCNC: 105 MMOL/L — SIGNIFICANT CHANGE UP (ref 98–107)
CO2 SERPL-SCNC: 16 MMOL/L — LOW (ref 22–31)
CREAT SERPL-MCNC: 0.59 MG/DL — SIGNIFICANT CHANGE UP (ref 0.5–1.3)
EGFR: 104 ML/MIN/1.73M2 — SIGNIFICANT CHANGE UP
EOSINOPHIL # BLD AUTO: 0.02 K/UL — SIGNIFICANT CHANGE UP (ref 0–0.5)
EOSINOPHIL NFR BLD AUTO: 0.3 % — SIGNIFICANT CHANGE UP (ref 0–6)
GLUCOSE SERPL-MCNC: 114 MG/DL — HIGH (ref 70–99)
HCT VFR BLD CALC: 32.9 % — LOW (ref 34.5–45)
HGB BLD-MCNC: 10.5 G/DL — LOW (ref 11.5–15.5)
IANC: 5.43 K/UL — SIGNIFICANT CHANGE UP (ref 1.8–7.4)
IMM GRANULOCYTES NFR BLD AUTO: 0.6 % — SIGNIFICANT CHANGE UP (ref 0–0.9)
LYMPHOCYTES # BLD AUTO: 0.69 K/UL — LOW (ref 1–3.3)
LYMPHOCYTES # BLD AUTO: 10.6 % — LOW (ref 13–44)
MAGNESIUM SERPL-MCNC: 1.6 MG/DL — SIGNIFICANT CHANGE UP (ref 1.6–2.6)
MCHC RBC-ENTMCNC: 29.9 PG — SIGNIFICANT CHANGE UP (ref 27–34)
MCHC RBC-ENTMCNC: 31.9 GM/DL — LOW (ref 32–36)
MCV RBC AUTO: 93.7 FL — SIGNIFICANT CHANGE UP (ref 80–100)
MONOCYTES # BLD AUTO: 0.35 K/UL — SIGNIFICANT CHANGE UP (ref 0–0.9)
MONOCYTES NFR BLD AUTO: 5.4 % — SIGNIFICANT CHANGE UP (ref 2–14)
NEUTROPHILS # BLD AUTO: 5.43 K/UL — SIGNIFICANT CHANGE UP (ref 1.8–7.4)
NEUTROPHILS NFR BLD AUTO: 82.9 % — HIGH (ref 43–77)
NRBC # BLD: 0 /100 WBCS — SIGNIFICANT CHANGE UP (ref 0–0)
NRBC # FLD: 0 K/UL — SIGNIFICANT CHANGE UP (ref 0–0)
PHOSPHATE SERPL-MCNC: 3.2 MG/DL — SIGNIFICANT CHANGE UP (ref 2.5–4.5)
PLATELET # BLD AUTO: 149 K/UL — LOW (ref 150–400)
POTASSIUM SERPL-MCNC: 3.9 MMOL/L — SIGNIFICANT CHANGE UP (ref 3.5–5.3)
POTASSIUM SERPL-SCNC: 3.9 MMOL/L — SIGNIFICANT CHANGE UP (ref 3.5–5.3)
PROT SERPL-MCNC: 7.7 G/DL — SIGNIFICANT CHANGE UP (ref 6–8.3)
RBC # BLD: 3.51 M/UL — LOW (ref 3.8–5.2)
RBC # FLD: 13.7 % — SIGNIFICANT CHANGE UP (ref 10.3–14.5)
SODIUM SERPL-SCNC: 137 MMOL/L — SIGNIFICANT CHANGE UP (ref 135–145)
WBC # BLD: 6.54 K/UL — SIGNIFICANT CHANGE UP (ref 3.8–10.5)
WBC # FLD AUTO: 6.54 K/UL — SIGNIFICANT CHANGE UP (ref 3.8–10.5)

## 2024-04-24 PROCEDURE — 99239 HOSP IP/OBS DSCHRG MGMT >30: CPT

## 2024-04-24 RX ORDER — PANTOPRAZOLE SODIUM 20 MG/1
1 TABLET, DELAYED RELEASE ORAL
Qty: 30 | Refills: 0
Start: 2024-04-24 | End: 2024-05-23

## 2024-04-24 RX ORDER — ONDANSETRON 8 MG/1
1 TABLET, FILM COATED ORAL
Qty: 30 | Refills: 0
Start: 2024-04-24 | End: 2024-05-03

## 2024-04-24 RX ORDER — ONDANSETRON 8 MG/1
8 TABLET, FILM COATED ORAL EVERY 8 HOURS
Refills: 0 | Status: DISCONTINUED | OUTPATIENT
Start: 2024-04-24 | End: 2024-04-24

## 2024-04-24 RX ORDER — METOCLOPRAMIDE HCL 10 MG
1 TABLET ORAL
Qty: 56 | Refills: 0
Start: 2024-04-24 | End: 2024-05-07

## 2024-04-24 RX ORDER — PANTOPRAZOLE SODIUM 20 MG/1
40 TABLET, DELAYED RELEASE ORAL DAILY
Refills: 0 | Status: DISCONTINUED | OUTPATIENT
Start: 2024-04-24 | End: 2024-04-24

## 2024-04-24 RX ORDER — MAGNESIUM OXIDE 400 MG ORAL TABLET 241.3 MG
400 TABLET ORAL ONCE
Refills: 0 | Status: COMPLETED | OUTPATIENT
Start: 2024-04-24 | End: 2024-04-24

## 2024-04-24 RX ORDER — DEXAMETHASONE 0.5 MG/5ML
2 ELIXIR ORAL
Qty: 4 | Refills: 0
Start: 2024-04-24 | End: 2024-04-25

## 2024-04-24 RX ORDER — DEXAMETHASONE 0.5 MG/5ML
8 ELIXIR ORAL DAILY
Refills: 0 | Status: DISCONTINUED | OUTPATIENT
Start: 2024-04-24 | End: 2024-04-24

## 2024-04-24 RX ADMIN — PANTOPRAZOLE SODIUM 40 MILLIGRAM(S): 20 TABLET, DELAYED RELEASE ORAL at 10:18

## 2024-04-24 RX ADMIN — MAGNESIUM OXIDE 400 MG ORAL TABLET 400 MILLIGRAM(S): 241.3 TABLET ORAL at 11:44

## 2024-04-24 RX ADMIN — FAMOTIDINE 20 MILLIGRAM(S): 10 INJECTION INTRAVENOUS at 11:44

## 2024-04-24 RX ADMIN — Medication 8 MILLIGRAM(S): at 10:18

## 2024-04-24 NOTE — DISCHARGE NOTE PROVIDER - ATTENDING DISCHARGE PHYSICAL EXAMINATION:
Well-appearing woman, laying comfortably in bed, nad  Anicteric sclera; no oral lesions/thrush  RRR, no m/r/g  CTAB, no w/c/r  Abd soft/nd/nt, normoactive bowel sounds  No peripheral edema  CN 2-1 grossly intact; no gross focal neuro deficits Vital Signs Last 24 Hrs  T(C): 36.6 (24 Apr 2024 13:15), Max: 37.1 (23 Apr 2024 19:10)  T(F): 97.9 (24 Apr 2024 13:15), Max: 98.8 (23 Apr 2024 21:33)  HR: 90 (24 Apr 2024 13:15) (64 - 99)  BP: 136/83 (24 Apr 2024 13:15) (112/68 - 136/83)  RR: 20 (24 Apr 2024 13:15) (16 - 26)  SpO2: 99% (24 Apr 2024 13:15) (97% - 100%)  Parameters below as of 24 Apr 2024 13:15  Patient On (Oxygen Delivery Method): room air  Well-appearing woman, laying comfortably in bed, nad  Anicteric sclera; no oral lesions/thrush  RRR, no m/r/g  CTAB, no w/c/r  Abd soft/nd/nt, normoactive bowel sounds  No peripheral edema  CN 2-1 grossly intact; no gross focal neuro deficits

## 2024-04-24 NOTE — DISCHARGE NOTE NURSING/CASE MANAGEMENT/SOCIAL WORK - PATIENT PORTAL LINK FT
You can access the FollowMyHealth Patient Portal offered by [facility] by registering at the following website: [email or web address]. By joining [employer]’s FollowMyHealth portal, you will also be able to view your health information using other applications (apps) compatible with our system.

## 2024-04-24 NOTE — DISCHARGE NOTE PROVIDER - NSDCCPCAREPLAN_GEN_ALL_CORE_FT
PRINCIPAL DISCHARGE DIAGNOSIS  Diagnosis: Allergy desensitization therapy  Assessment and Plan of Treatment: You were admitted to the hospital for chemotherapy (carboplatin) desensitization.  You had some rash in your palm and face, improved with Zyrtec and Benadryl.  You will continue your follow up with Dr. Au after discharge      SECONDARY DISCHARGE DIAGNOSES  Diagnosis: Chemotherapy-induced nausea  Assessment and Plan of Treatment: You were started on Dexamethasone 8mg daily x 3 days (4/24-4/26).  You also were sent home with Zofran and Reglan as needed for worsening nausea at home    Diagnosis: Ovarian cancer  Assessment and Plan of Treatment: Please continue your follow up with Dr. Au on discharge

## 2024-04-24 NOTE — DISCHARGE NOTE PROVIDER - HOSPITAL COURSE
Ashley Logan was admitted to [facility]  from PACU via bed accompanied by RN. Reason for hospitalization is L TKA. Upon arrival, patient is stable. Patient has history significant for   Past Medical History:   Diagnosis Date   â¢ CAD (coronary artery disease)    â¢ CKD (chronic kidney disease)    â¢ Diabetes mellitus (CMS/HCC)    â¢ GERD (gastroesophageal reflux disease)    â¢ Glaucoma associated with chamber angle anomalies, right, severe stage    â¢ Hyperlipidemia    â¢ Hypothyroidism    â¢ TIA (transient ischemic attack)      . Patient oriented to bed, call light, , room and unit. Patient provided with the following educational materials upon admission:safety, advanced directives, infection control and pain. Level of understanding patient verbalized understanding. Admission orders received at this time. Family notified of patient arrival per PACU RN. See Epic documentation for patient individualized nursing care plan. 58 y/o F w/ PMHx of HTN, fatty liver w/ gallstones, PE (2018), ventral hernia not repaired, and  Stage Toshia high grade serous ovarian carcinoma, progressed through multiple lines of therapies currently being retreated w/ Carboplatin c/b reaction after C2, also noted generalized rash and itchiness during C3 improved with Benadryl and slower infusion rate, now directly admitted for C4 Carbo Desens    Heme/onc  #Stage Toshia high grade serous ovarian carcinoma  - Reaction 10mins into C2 Carboplatin infusion  - S/p C3 Carbo desens 4/2 c/b generalized rash and itchiness improved with Zytrec and Benadryl.  - Now directly admitted for C4 Carbo desens, c/b B/L palm erythema and itchiness, as well around her nose, improved with Zyrtec and Benadryl   - Increase delayed nausea, started Dex 8mg QD x3D.  Added Zofran and Reglan PRN for home  - Overall plan per Dr. Au 58 y/o F w/ PMHx of HTN, fatty liver w/ gallstones, PE (2018), ventral hernia not repaired, and  Stage Toshia high grade serous ovarian carcinoma, progressed through multiple lines of therapies currently being retreated w/ Carboplatin c/b reaction after C2, also noted generalized rash and itchiness during C3 improved with Benadryl and slower infusion rate, now directly admitted for C4 Carbo Desens    Heme/onc  #Stage Toshia high grade serous ovarian carcinoma  - Reaction 10mins into C2 Carboplatin infusion  - S/p C3 Carbo desens 4/2 c/b generalized rash and itchiness improved with Zytrec and Benadryl.  - Directly admitted for C4 Carbo desens, c/b B/L palm erythema and itchiness, as well around her nose, improved with Zyrtec and Benadryl   - Completing 3d course of Dex 8mg QD for delayed n/v related to chemotherapy (which. pt reported she experienced after her last cycle).  Added Zofran and Reglan PRN for home  - Overall plan per Dr. Au

## 2024-04-24 NOTE — DISCHARGE NOTE PROVIDER - NSDCFUSCHEDAPPT_GEN_ALL_CORE_FT
Guillermina Au  [facility]  Luis Mullen  Scheduled Appointment: 05/07/2024

## 2024-04-24 NOTE — DISCHARGE NOTE PROVIDER - NSDCMRMEDTOKEN_GEN_ALL_CORE_FT
dexAMETHasone 4 mg oral tablet: 2 tab(s) orally once a day  metoclopramide 10 mg oral tablet: 1 tab(s) orally every 6 hours as needed for worsening nausea  ondansetron 8 mg oral tablet: 1 tab(s) orally every 8 hours as needed for Nausea and/or Vomiting  Protonix 40 mg oral delayed release tablet: 1 tab(s) orally once a day

## 2024-04-24 NOTE — DISCHARGE NOTE NURSING/CASE MANAGEMENT/SOCIAL WORK - NSDCPEFALRISK_GEN_ALL_CORE
For information on Fall & Injury Prevention, visit: [email or web address] OR  [email or web address] OR  https://www.cdc.gov/steadi/patient.html

## 2024-04-26 ENCOUNTER — OUTPATIENT (OUTPATIENT)
Dept: OUTPATIENT SERVICES | Facility: HOSPITAL | Age: 59
LOS: 1 days | Discharge: HOME | End: 2024-04-26

## 2024-04-26 DIAGNOSIS — C56.9 MALIGNANT NEOPLASM OF UNSPECIFIED OVARY: ICD-10-CM

## 2024-05-07 ENCOUNTER — RESULT REVIEW (OUTPATIENT)
Age: 59
End: 2024-05-07

## 2024-05-07 ENCOUNTER — APPOINTMENT (OUTPATIENT)
Dept: HEMATOLOGY ONCOLOGY | Facility: CLINIC | Age: 59
End: 2024-05-07
Payer: MEDICAID

## 2024-05-07 VITALS
SYSTOLIC BLOOD PRESSURE: 104 MMHG | TEMPERATURE: 97.7 F | BODY MASS INDEX: 29.35 KG/M2 | RESPIRATION RATE: 17 BRPM | OXYGEN SATURATION: 98 % | HEART RATE: 100 BPM | DIASTOLIC BLOOD PRESSURE: 74 MMHG | WEIGHT: 176.37 LBS

## 2024-05-07 LAB
BASOPHILS # BLD AUTO: 0.01 K/UL — SIGNIFICANT CHANGE UP (ref 0–0.2)
BASOPHILS NFR BLD AUTO: 0.2 % — SIGNIFICANT CHANGE UP (ref 0–2)
EOSINOPHIL # BLD AUTO: 0.02 K/UL — SIGNIFICANT CHANGE UP (ref 0–0.5)
EOSINOPHIL NFR BLD AUTO: 0.4 % — SIGNIFICANT CHANGE UP (ref 0–6)
HCT VFR BLD CALC: 31 % — LOW (ref 34.5–45)
HGB BLD-MCNC: 10.1 G/DL — LOW (ref 11.5–15.5)
IMM GRANULOCYTES NFR BLD AUTO: 0.5 % — SIGNIFICANT CHANGE UP (ref 0–0.9)
LYMPHOCYTES # BLD AUTO: 0.78 K/UL — LOW (ref 1–3.3)
LYMPHOCYTES # BLD AUTO: 14 % — SIGNIFICANT CHANGE UP (ref 13–44)
MCHC RBC-ENTMCNC: 31.1 PG — SIGNIFICANT CHANGE UP (ref 27–34)
MCHC RBC-ENTMCNC: 32.6 G/DL — SIGNIFICANT CHANGE UP (ref 32–36)
MCV RBC AUTO: 95.4 FL — SIGNIFICANT CHANGE UP (ref 80–100)
MONOCYTES # BLD AUTO: 0.39 K/UL — SIGNIFICANT CHANGE UP (ref 0–0.9)
MONOCYTES NFR BLD AUTO: 7 % — SIGNIFICANT CHANGE UP (ref 2–14)
NEUTROPHILS # BLD AUTO: 4.33 K/UL — SIGNIFICANT CHANGE UP (ref 1.8–7.4)
NEUTROPHILS NFR BLD AUTO: 77.9 % — HIGH (ref 43–77)
NRBC # BLD: 0 /100 WBCS — SIGNIFICANT CHANGE UP (ref 0–0)
PLATELET # BLD AUTO: 153 K/UL — SIGNIFICANT CHANGE UP (ref 150–400)
RBC # BLD: 3.25 M/UL — LOW (ref 3.8–5.2)
RBC # FLD: 14.3 % — SIGNIFICANT CHANGE UP (ref 10.3–14.5)
WBC # BLD: 5.56 K/UL — SIGNIFICANT CHANGE UP (ref 3.8–10.5)
WBC # FLD AUTO: 5.56 K/UL — SIGNIFICANT CHANGE UP (ref 3.8–10.5)

## 2024-05-07 PROCEDURE — 99214 OFFICE O/P EST MOD 30 MIN: CPT

## 2024-05-07 NOTE — HISTORY OF PRESENT ILLNESS
[Disease: _____________________] : Disease: [unfilled] [T: ___] : T[unfilled] [N: ___] : N[unfilled] [M: ___] : M[unfilled] [AJCC Stage: ____] : AJCC Stage: [unfilled] [de-identified] : 58 yo F with a PMH of HTN, fatty liver disease with gallstones, PE (2018), ventral hernia (not repaired), and who presents for management of high grade serous ovarian carcinoma.  She initially had abdominal pain in late 2017 with elevated CA-125, found to have b/l adnexal masses; did not have gross metastatic disease at that time. She had a SUAD-BSO, omentectomy, appendectomy, and tumor debulking 1/29/18, and was found to have high grade serous ovarian carcinoma (TMB 4 Muts/Mb, no targetable mutations, BRCA negative, Ki67 > 90%), PDL1 < 1%, uP0sPnH1 disease; underwent 6 cycles of Carbo/Taxol 03-06/2018 (done at [facility]) Found to have worsening abdominal and particularly R pelvic LAD on 11/2018. She was then seen by Dr. Maher. Started Doxil/Avastin, 4 cycles 12/2018 - 03/2019 Topotecan/Avastin, 03/2019 - 08/2019, followed by pelvic LN IMRT/IGRT x 5 fx Abraxane 01/2020, followed by 5 fx of RT (06/2020), improved periportal, mesenteric, paracolic, and near resolution of L external iliac LAD, with improved R external iliac LAD. Restarted Abraxane 06/2020. Increase in L external iliac LN 05/2021, agreed to radiate 5 fx every other day and hold off change in therapy, c/b diarrhea. Resumed Abraxane after (c/b neuropathy) Interval progression of disease in November 2021 with R adrenal gland nodule and increasing L external iliac and mesenteric LAD. Started on Niraparib 12/9/21. CT 5/2022 showed an increase in the size of her R adrenal LN and mesenteric LAD with new R retrocaval LAD. S/p adrenal bx 5/25/22 confirming metastatic ovarian carcinoma, Ki67 90%. Foundation showed MSI-S disease, TMB 4 Muts/Mb, ALANA 13.5%, AKT2 amplification, CCNE1 amplification, PIK3CB amplification, TP53 C277*, CIC rearrangement exon 15, MYCL1 amplification, and NFKBIA amplification but no targetable mutations. In 06/2022, she started single agent Gemcitabine on days 1, 8 of 21 day cycle. CT 09/2022 showed overall POD with enlarging R adrenal nodule and RP LAD with slightly decreased mesenteric LAD. She was planned to start Alimta, but referred to Dr. Au to evaluate for possible clinical trial.  She lost about 15 pounds since the Gemcitabine treatment but has gained 4 of it back. Prior to Gemcitabine, her weight was stable. Currently c/o constipation, no blood in stool. No N/V, CP, cough. She thinks she had an allergic reaction (dyspnea, flushing) to her 3rd chemotherapy, but was improved after infusing slowly. No reaction to Carbo/Taxol. She had myalgias constantly since starting chemotherapy, but improved with Tylenol. Also has a moderate headache (without blurry vision) that improves  She had a PE when originally diagnosed in 2018, as well as in 2022 in the pelvis.  Family Hx of prostate cancer (father, age 64) and maternal aunt (breast cancer, young age). No smoking, alcohol, or drug use history. Lives with her  and adult children. Able to function but usually has to stop housework after about 20-30 minutes, takes 5 minute break, and then restart. She does not go outside much because she has pain from her ventral hernia (she has not had it repaired due to cancer).  Since her last visit with us in 2022 she received Alimta starting 9/30/22. Found to have POD in December 2022. She was started on Letrozole, FOLR1 testing was done concurrently and found to be negative. She also recieved palliative RT to adrenal met in February 2023. She was found to have POD in April 2023 and treatment was changed to taxol and Avastin starting 5/4/23.  She was most recently started on treatment with retreat Carboplatin in January 2024 but had reaction about 10 min into 2nd cycle on 2/14/24 (treated outpatient, did not need hospital transfer) She is here today to discuss possible treatment with Carboplatin via desensitization. [de-identified] : High Grade Serous Ovarian Carcinoma [de-identified] : [place] Carboplatin( weekly AUC-2)- 1/12/24- 1/26/24- HSR on week 2 Paclitaxel/ Anastasiia- 5/4/2023- 12/29/23- 9 cycles Letrozole- 1/6/2023- 4/28/2023 Alimta 9/30/22- 1/6/2023- 4 cycles Gemcitabine - 6/9/2022- 9/15/2022 - four cycles Niraparib- 12/9/21- 4/28/22 Abraxane 1/23/2020- 12/3/2021- six cycles Topotecan/ Avastin- 4/2/19- 7/30/19- four cycles Doxil/ Avastin- 12/4/18- 3/28/19- four cycles. [de-identified] : She has nausea and some vomiting. She takes anti emetics with good relief. She feels very tired and weak. She had an episode of spotting. Appetite is poor. She continues to have mild reaction at time of infusion.

## 2024-05-08 LAB
ALBUMIN SERPL ELPH-MCNC: 4.2 G/DL
ALP BLD-CCNC: 129 U/L
ALT SERPL-CCNC: 28 U/L
ANION GAP SERPL CALC-SCNC: 18 MMOL/L
AST SERPL-CCNC: 27 U/L
BILIRUB SERPL-MCNC: 0.3 MG/DL
BUN SERPL-MCNC: 13 MG/DL
CALCIUM SERPL-MCNC: 9.8 MG/DL
CANCER AG125 SERPL-ACNC: 9 U/ML
CHLORIDE SERPL-SCNC: 104 MMOL/L
CO2 SERPL-SCNC: 20 MMOL/L
CREAT SERPL-MCNC: 0.84 MG/DL
EGFR: 80 ML/MIN/1.73M2
GLUCOSE SERPL-MCNC: 120 MG/DL
MAGNESIUM SERPL-MCNC: 1.7 MG/DL
POTASSIUM SERPL-SCNC: 4.8 MMOL/L
PROT SERPL-MCNC: 7.7 G/DL
SODIUM SERPL-SCNC: 142 MMOL/L

## 2024-05-14 ENCOUNTER — INPATIENT (INPATIENT)
Facility: HOSPITAL | Age: 59
LOS: 0 days | Discharge: HOME | End: 2024-05-15
Attending: INTERNAL MEDICINE | Admitting: INTERNAL MEDICINE
Payer: MEDICAID

## 2024-05-14 VITALS
WEIGHT: 166.23 LBS | TEMPERATURE: 98 F | HEIGHT: 65 IN | RESPIRATION RATE: 18 BRPM | HEART RATE: 92 BPM | SYSTOLIC BLOOD PRESSURE: 116 MMHG | OXYGEN SATURATION: 99 % | DIASTOLIC BLOOD PRESSURE: 84 MMHG

## 2024-05-14 DIAGNOSIS — C56.9 MALIGNANT NEOPLASM OF UNSPECIFIED OVARY: ICD-10-CM

## 2024-05-14 LAB
ALBUMIN SERPL ELPH-MCNC: 3.8 G/DL — SIGNIFICANT CHANGE UP (ref 3.3–5)
ALP SERPL-CCNC: 146 U/L — HIGH (ref 40–120)
ALT FLD-CCNC: 30 U/L — SIGNIFICANT CHANGE UP (ref 4–33)
ANION GAP SERPL CALC-SCNC: 16 MMOL/L — HIGH (ref 7–14)
AST SERPL-CCNC: 22 U/L — SIGNIFICANT CHANGE UP (ref 4–32)
BASOPHILS # BLD AUTO: 0.01 K/UL — SIGNIFICANT CHANGE UP (ref 0–0.2)
BASOPHILS NFR BLD AUTO: 0.2 % — SIGNIFICANT CHANGE UP (ref 0–2)
BILIRUB SERPL-MCNC: 0.3 MG/DL — SIGNIFICANT CHANGE UP (ref 0.2–1.2)
BUN SERPL-MCNC: 18 MG/DL — SIGNIFICANT CHANGE UP (ref 7–23)
CALCIUM SERPL-MCNC: 9.7 MG/DL — SIGNIFICANT CHANGE UP (ref 8.4–10.5)
CHLORIDE SERPL-SCNC: 103 MMOL/L — SIGNIFICANT CHANGE UP (ref 98–107)
CO2 SERPL-SCNC: 17 MMOL/L — LOW (ref 22–31)
CREAT SERPL-MCNC: 0.79 MG/DL — SIGNIFICANT CHANGE UP (ref 0.5–1.3)
EGFR: 87 ML/MIN/1.73M2 — SIGNIFICANT CHANGE UP
EOSINOPHIL # BLD AUTO: 0.03 K/UL — SIGNIFICANT CHANGE UP (ref 0–0.5)
EOSINOPHIL NFR BLD AUTO: 0.7 % — SIGNIFICANT CHANGE UP (ref 0–6)
GLUCOSE SERPL-MCNC: 96 MG/DL — SIGNIFICANT CHANGE UP (ref 70–99)
HCT VFR BLD CALC: 31.3 % — LOW (ref 34.5–45)
HGB BLD-MCNC: 9.8 G/DL — LOW (ref 11.5–15.5)
IANC: 2.94 K/UL — SIGNIFICANT CHANGE UP (ref 1.8–7.4)
IMM GRANULOCYTES NFR BLD AUTO: 0.7 % — SIGNIFICANT CHANGE UP (ref 0–0.9)
LYMPHOCYTES # BLD AUTO: 0.85 K/UL — LOW (ref 1–3.3)
LYMPHOCYTES # BLD AUTO: 20 % — SIGNIFICANT CHANGE UP (ref 13–44)
MAGNESIUM SERPL-MCNC: 1.7 MG/DL — SIGNIFICANT CHANGE UP (ref 1.6–2.6)
MCHC RBC-ENTMCNC: 30.2 PG — SIGNIFICANT CHANGE UP (ref 27–34)
MCHC RBC-ENTMCNC: 31.3 GM/DL — LOW (ref 32–36)
MCV RBC AUTO: 96.3 FL — SIGNIFICANT CHANGE UP (ref 80–100)
MONOCYTES # BLD AUTO: 0.38 K/UL — SIGNIFICANT CHANGE UP (ref 0–0.9)
MONOCYTES NFR BLD AUTO: 9 % — SIGNIFICANT CHANGE UP (ref 2–14)
NEUTROPHILS # BLD AUTO: 2.94 K/UL — SIGNIFICANT CHANGE UP (ref 1.8–7.4)
NEUTROPHILS NFR BLD AUTO: 69.4 % — SIGNIFICANT CHANGE UP (ref 43–77)
NRBC # BLD: 0 /100 WBCS — SIGNIFICANT CHANGE UP (ref 0–0)
NRBC # FLD: 0 K/UL — SIGNIFICANT CHANGE UP (ref 0–0)
PHOSPHATE SERPL-MCNC: 3.9 MG/DL — SIGNIFICANT CHANGE UP (ref 2.5–4.5)
PLATELET # BLD AUTO: 206 K/UL — SIGNIFICANT CHANGE UP (ref 150–400)
POTASSIUM SERPL-MCNC: 4.6 MMOL/L — SIGNIFICANT CHANGE UP (ref 3.5–5.3)
POTASSIUM SERPL-SCNC: 4.6 MMOL/L — SIGNIFICANT CHANGE UP (ref 3.5–5.3)
PROT SERPL-MCNC: 7.8 G/DL — SIGNIFICANT CHANGE UP (ref 6–8.3)
RBC # BLD: 3.25 M/UL — LOW (ref 3.8–5.2)
RBC # FLD: 15.1 % — HIGH (ref 10.3–14.5)
SODIUM SERPL-SCNC: 136 MMOL/L — SIGNIFICANT CHANGE UP (ref 135–145)
WBC # BLD: 4.24 K/UL — SIGNIFICANT CHANGE UP (ref 3.8–10.5)
WBC # FLD AUTO: 4.24 K/UL — SIGNIFICANT CHANGE UP (ref 3.8–10.5)

## 2024-05-14 PROCEDURE — 99254 IP/OBS CNSLTJ NEW/EST MOD 60: CPT

## 2024-05-14 RX ORDER — DIPHENHYDRAMINE HCL 50 MG
50 CAPSULE ORAL ONCE
Refills: 0 | Status: DISCONTINUED | OUTPATIENT
Start: 2024-05-14 | End: 2024-05-15

## 2024-05-14 RX ORDER — EPINEPHRINE 0.3 MG/.3ML
0.3 INJECTION INTRAMUSCULAR; SUBCUTANEOUS ONCE
Refills: 0 | Status: DISCONTINUED | OUTPATIENT
Start: 2024-05-14 | End: 2024-05-15

## 2024-05-14 RX ORDER — CETIRIZINE HYDROCHLORIDE 10 MG/1
10 TABLET ORAL ONCE
Refills: 0 | Status: COMPLETED | OUTPATIENT
Start: 2024-05-14 | End: 2024-05-14

## 2024-05-14 RX ORDER — ALBUTEROL 90 UG/1
2.5 AEROSOL, METERED ORAL ONCE
Refills: 0 | Status: DISCONTINUED | OUTPATIENT
Start: 2024-05-14 | End: 2024-05-15

## 2024-05-14 RX ORDER — DEXAMETHASONE 0.5 MG/5ML
8 ELIXIR ORAL ONCE
Refills: 0 | Status: COMPLETED | OUTPATIENT
Start: 2024-05-14 | End: 2024-05-14

## 2024-05-14 RX ORDER — METOCLOPRAMIDE HCL 10 MG
10 TABLET ORAL ONCE
Refills: 0 | Status: COMPLETED | OUTPATIENT
Start: 2024-05-14 | End: 2024-05-14

## 2024-05-14 RX ORDER — DIPHENHYDRAMINE HCL 50 MG
25 CAPSULE ORAL ONCE
Refills: 0 | Status: COMPLETED | OUTPATIENT
Start: 2024-05-14 | End: 2024-05-14

## 2024-05-14 RX ORDER — FAMOTIDINE 10 MG/ML
20 INJECTION INTRAVENOUS ONCE
Refills: 0 | Status: COMPLETED | OUTPATIENT
Start: 2024-05-14 | End: 2024-05-14

## 2024-05-14 RX ORDER — GLUCAGON INJECTION, SOLUTION 0.5 MG/.1ML
2 INJECTION, SOLUTION SUBCUTANEOUS ONCE
Refills: 0 | Status: DISCONTINUED | OUTPATIENT
Start: 2024-05-14 | End: 2024-05-15

## 2024-05-14 RX ORDER — ONDANSETRON 8 MG/1
16 TABLET, FILM COATED ORAL ONCE
Refills: 0 | Status: COMPLETED | OUTPATIENT
Start: 2024-05-14 | End: 2024-05-14

## 2024-05-14 RX ORDER — MONTELUKAST 4 MG/1
10 TABLET, CHEWABLE ORAL ONCE
Refills: 0 | Status: COMPLETED | OUTPATIENT
Start: 2024-05-14 | End: 2024-05-14

## 2024-05-14 RX ORDER — SODIUM CHLORIDE 9 MG/ML
1000 INJECTION INTRAMUSCULAR; INTRAVENOUS; SUBCUTANEOUS
Refills: 0 | Status: DISCONTINUED | OUTPATIENT
Start: 2024-05-14 | End: 2024-05-15

## 2024-05-14 RX ORDER — CARBOPLATIN 50 MG
590 VIAL (EA) INTRAVENOUS ONCE
Refills: 0 | Status: COMPLETED | OUTPATIENT
Start: 2024-05-14 | End: 2024-05-14

## 2024-05-14 RX ORDER — GLUCAGON INJECTION, SOLUTION 0.5 MG/.1ML
1 INJECTION, SOLUTION SUBCUTANEOUS ONCE
Refills: 0 | Status: DISCONTINUED | OUTPATIENT
Start: 2024-05-14 | End: 2024-05-15

## 2024-05-14 RX ORDER — DIPHENHYDRAMINE HCL 50 MG
50 CAPSULE ORAL ONCE
Refills: 0 | Status: COMPLETED | OUTPATIENT
Start: 2024-05-14 | End: 2024-05-14

## 2024-05-14 RX ADMIN — Medication 590 MILLIGRAM(S): at 12:46

## 2024-05-14 RX ADMIN — CETIRIZINE HYDROCHLORIDE 10 MILLIGRAM(S): 10 TABLET ORAL at 10:50

## 2024-05-14 RX ADMIN — ONDANSETRON 116 MILLIGRAM(S): 8 TABLET, FILM COATED ORAL at 11:40

## 2024-05-14 RX ADMIN — SODIUM CHLORIDE 250 MILLILITER(S): 9 INJECTION INTRAMUSCULAR; INTRAVENOUS; SUBCUTANEOUS at 12:46

## 2024-05-14 RX ADMIN — Medication 101.6 MILLIGRAM(S): at 10:54

## 2024-05-14 RX ADMIN — Medication 25 MILLIGRAM(S): at 16:15

## 2024-05-14 RX ADMIN — Medication 10 MILLIGRAM(S): at 11:38

## 2024-05-14 RX ADMIN — Medication 25 MILLIGRAM(S): at 10:49

## 2024-05-14 RX ADMIN — FAMOTIDINE 20 MILLIGRAM(S): 10 INJECTION INTRAVENOUS at 11:37

## 2024-05-14 RX ADMIN — Medication 1 MILLIGRAM(S): at 12:01

## 2024-05-14 RX ADMIN — MONTELUKAST 10 MILLIGRAM(S): 4 TABLET, CHEWABLE ORAL at 10:50

## 2024-05-14 RX ADMIN — Medication 50 MILLIGRAM(S): at 15:24

## 2024-05-14 RX ADMIN — CETIRIZINE HYDROCHLORIDE 10 MILLIGRAM(S): 10 TABLET ORAL at 14:56

## 2024-05-14 NOTE — H&P ADULT - TIME BILLING
15 mins-Chart and previous hospitalizations reviewed as well as labs, vitals prior to patient being seen for approximately   15 mins-Meds reviewed and prior outpatient workup   15 mins-Patient seen and examined and plan discussed, all questions were answered to the best of my ability  30 mins-Charting/documentation and orders.

## 2024-05-14 NOTE — H&P ADULT - ASSESSMENT
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 Pt is directly admitted for C5 Carboplatin with desensitization procotol.    ACTIVE PROBLEMS  Met ovarian ca  Admission for chemotherapy  Desensitization to Allergen    - Proceeding with desensitization protocol as planned    - Monitoring for signs of hypersensitivity reaction/anaphylaxis; will tx as per protocol recommended by Allergy    - Pt to continue outpt fu with Dr. Au after discharge    - GI/DVT ppx: diet/ambulation    
room air

## 2024-05-14 NOTE — H&P ADULT - HISTORY OF PRESENT ILLNESS
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 Pt is directly admitted for C5 Carboplatin with desensitization procotol. She reports feeling well and has no complaints currently.

## 2024-05-14 NOTE — PATIENT PROFILE ADULT - FALL HARM RISK - HARM RISK INTERVENTIONS

## 2024-05-14 NOTE — H&P ADULT - NSHPPHYSICALEXAM_GEN_ALL_CORE
VS pending.    Well-appearing woman, laying comfortably in bed, nad  Anicteric sclera; no oral lesions/thrush  RRR, no m/r/g  CTAB, no w/c/r  Abd soft/nd/nt, normoactive bowel sounds  No peripheral edema  CN 2-1 grossly intact; no gross focal neuro deficits

## 2024-05-15 ENCOUNTER — TRANSCRIPTION ENCOUNTER (OUTPATIENT)
Age: 59
End: 2024-05-15

## 2024-05-15 VITALS
RESPIRATION RATE: 16 BRPM | SYSTOLIC BLOOD PRESSURE: 113 MMHG | HEART RATE: 65 BPM | OXYGEN SATURATION: 98 % | DIASTOLIC BLOOD PRESSURE: 72 MMHG | TEMPERATURE: 98 F

## 2024-05-15 LAB
ALBUMIN SERPL ELPH-MCNC: 4 G/DL — SIGNIFICANT CHANGE UP (ref 3.3–5)
ALP SERPL-CCNC: 132 U/L — HIGH (ref 40–120)
ALT FLD-CCNC: 29 U/L — SIGNIFICANT CHANGE UP (ref 4–33)
ANION GAP SERPL CALC-SCNC: 14 MMOL/L — SIGNIFICANT CHANGE UP (ref 7–14)
AST SERPL-CCNC: 21 U/L — SIGNIFICANT CHANGE UP (ref 4–32)
BASOPHILS # BLD AUTO: 0 K/UL — SIGNIFICANT CHANGE UP (ref 0–0.2)
BASOPHILS NFR BLD AUTO: 0 % — SIGNIFICANT CHANGE UP (ref 0–2)
BILIRUB SERPL-MCNC: 0.3 MG/DL — SIGNIFICANT CHANGE UP (ref 0.2–1.2)
BUN SERPL-MCNC: 16 MG/DL — SIGNIFICANT CHANGE UP (ref 7–23)
CALCIUM SERPL-MCNC: 9.6 MG/DL — SIGNIFICANT CHANGE UP (ref 8.4–10.5)
CHLORIDE SERPL-SCNC: 107 MMOL/L — SIGNIFICANT CHANGE UP (ref 98–107)
CO2 SERPL-SCNC: 19 MMOL/L — LOW (ref 22–31)
CREAT SERPL-MCNC: 0.69 MG/DL — SIGNIFICANT CHANGE UP (ref 0.5–1.3)
EGFR: 101 ML/MIN/1.73M2 — SIGNIFICANT CHANGE UP
EOSINOPHIL # BLD AUTO: 0 K/UL — SIGNIFICANT CHANGE UP (ref 0–0.5)
EOSINOPHIL NFR BLD AUTO: 0 % — SIGNIFICANT CHANGE UP (ref 0–6)
GLUCOSE SERPL-MCNC: 124 MG/DL — HIGH (ref 70–99)
HCT VFR BLD CALC: 29.1 % — LOW (ref 34.5–45)
HGB BLD-MCNC: 9.6 G/DL — LOW (ref 11.5–15.5)
IANC: 3.75 K/UL — SIGNIFICANT CHANGE UP (ref 1.8–7.4)
IMM GRANULOCYTES NFR BLD AUTO: 0.4 % — SIGNIFICANT CHANGE UP (ref 0–0.9)
LDH SERPL L TO P-CCNC: 134 U/L — LOW (ref 135–225)
LYMPHOCYTES # BLD AUTO: 0.62 K/UL — LOW (ref 1–3.3)
LYMPHOCYTES # BLD AUTO: 13.3 % — SIGNIFICANT CHANGE UP (ref 13–44)
MAGNESIUM SERPL-MCNC: 1.6 MG/DL — SIGNIFICANT CHANGE UP (ref 1.6–2.6)
MCHC RBC-ENTMCNC: 30.4 PG — SIGNIFICANT CHANGE UP (ref 27–34)
MCHC RBC-ENTMCNC: 33 GM/DL — SIGNIFICANT CHANGE UP (ref 32–36)
MCV RBC AUTO: 92.1 FL — SIGNIFICANT CHANGE UP (ref 80–100)
MONOCYTES # BLD AUTO: 0.27 K/UL — SIGNIFICANT CHANGE UP (ref 0–0.9)
MONOCYTES NFR BLD AUTO: 5.8 % — SIGNIFICANT CHANGE UP (ref 2–14)
NEUTROPHILS # BLD AUTO: 3.75 K/UL — SIGNIFICANT CHANGE UP (ref 1.8–7.4)
NEUTROPHILS NFR BLD AUTO: 80.5 % — HIGH (ref 43–77)
NRBC # BLD: 0 /100 WBCS — SIGNIFICANT CHANGE UP (ref 0–0)
NRBC # FLD: 0 K/UL — SIGNIFICANT CHANGE UP (ref 0–0)
PHOSPHATE SERPL-MCNC: 3.4 MG/DL — SIGNIFICANT CHANGE UP (ref 2.5–4.5)
PLATELET # BLD AUTO: 191 K/UL — SIGNIFICANT CHANGE UP (ref 150–400)
POTASSIUM SERPL-MCNC: 4 MMOL/L — SIGNIFICANT CHANGE UP (ref 3.5–5.3)
POTASSIUM SERPL-SCNC: 4 MMOL/L — SIGNIFICANT CHANGE UP (ref 3.5–5.3)
PROT SERPL-MCNC: 8 G/DL — SIGNIFICANT CHANGE UP (ref 6–8.3)
RBC # BLD: 3.16 M/UL — LOW (ref 3.8–5.2)
RBC # BLD: 3.16 M/UL — LOW (ref 3.8–5.2)
RBC # FLD: 15.1 % — HIGH (ref 10.3–14.5)
RETICS #: 64.8 K/UL — SIGNIFICANT CHANGE UP (ref 25–125)
RETICS/RBC NFR: 2.1 % — SIGNIFICANT CHANGE UP (ref 0.5–2.5)
SODIUM SERPL-SCNC: 140 MMOL/L — SIGNIFICANT CHANGE UP (ref 135–145)
URATE SERPL-MCNC: 8.1 MG/DL — HIGH (ref 2.5–7)
WBC # BLD: 4.66 K/UL — SIGNIFICANT CHANGE UP (ref 3.8–10.5)
WBC # FLD AUTO: 4.66 K/UL — SIGNIFICANT CHANGE UP (ref 3.8–10.5)

## 2024-05-15 PROCEDURE — 99239 HOSP IP/OBS DSCHRG MGMT >30: CPT

## 2024-05-15 RX ORDER — DEXAMETHASONE 0.5 MG/5ML
2 ELIXIR ORAL
Qty: 6 | Refills: 0
Start: 2024-05-15 | End: 2024-05-17

## 2024-05-15 RX ORDER — ONDANSETRON 8 MG/1
1 TABLET, FILM COATED ORAL
Qty: 30 | Refills: 0
Start: 2024-05-15 | End: 2024-05-24

## 2024-05-15 RX ORDER — METOCLOPRAMIDE HCL 10 MG
1 TABLET ORAL
Qty: 40 | Refills: 0
Start: 2024-05-15 | End: 2024-05-24

## 2024-05-15 NOTE — DISCHARGE NOTE PROVIDER - CARE PROVIDER_API CALL
Guillermina Au  Hematology  [address] [place] [id number]  Phone: [phone]  Fax: [phone]  Established Patient  Follow Up Time: 1 week

## 2024-05-15 NOTE — DISCHARGE NOTE PROVIDER - CARE PROVIDERS DIRECT ADDRESSES
Patient is in extreme pain states her right knee is bruising and swollen. Patient can't walk to car and back without struggling. States she is thinking of going back to a cane. Patient scheduled for 12/27 but in the meantime wanting to know what she can do for the pain and burising or if she has to come in sooner.     [email or web address]

## 2024-05-15 NOTE — DISCHARGE NOTE PROVIDER - NSFOLLOWUPCLINICS_GEN_ALL_ED_FT
[facility]  Hematology/Oncology  [address] [id number]  Phone: [phone]  Fax:

## 2024-05-15 NOTE — DISCHARGE NOTE PROVIDER - HOSPITAL COURSE
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 Patient remains hemodynamically stable and is medically cleared for discharge home, followup with Dr. Guillermina Au     Stage [place]ia high grade serous ovarian carcinoma  - Reaction 10mins into C2 Carboplatin infusion  - S/p C3 Carbo desens 4/2 c/b generalized rash and itchiness improved with Zytrec and Benadryl, same occured with C4  - Directly admitted for C5 Carbo desensitization  - c/b infusion reaction with B/L upper extremity erythema and itchiness, as well on face- resolved s/p benadryl andd Zyrtec   - Plan to discharge on Dexa 8mg x 3 days and Zofran/Reglan prn post chemo for delayed nausea/vomiting   - Overall plan per Dr. Au

## 2024-05-15 NOTE — DISCHARGE NOTE NURSING/CASE MANAGEMENT/SOCIAL WORK - NSDCPEFALRISK_GEN_ALL_CORE
For information on Fall & Injury Prevention, visit: [email or web address] OR  [email or web address] OR  https://www.cdc.gov/steadi/patient.html

## 2024-05-15 NOTE — DISCHARGE NOTE PROVIDER - NSDCFUADDAPPT_GEN_ALL_CORE_FT
repeat bloodwork with your your PCP/oncologist withi n1 week (CBC, BMP, LFTs)     Close followup care with your oncologist for further management

## 2024-05-15 NOTE — CHART NOTE - NSCHARTNOTEFT_GEN_A_CORE
Vital Signs Last 24 Hrs  T(C): 36.7 (15 May 2024 12:41), Max: 37 (14 May 2024 20:29)  T(F): 98.1 (15 May 2024 12:41), Max: 98.6 (14 May 2024 20:29)  HR: 65 (15 May 2024 12:41) (65 - 85)  BP: 113/72 (15 May 2024 12:41) (113/72 - 133/88)  BP(mean): --  RR: 16 (15 May 2024 12:41) (16 - 18)  SpO2: 98% (15 May 2024 12:41) (97% - 100%)    Parameters below as of 15 May 2024 12:41  Patient On (Oxygen Delivery Method): room air                        9.6    4.66  )-----------( 191      ( 15 May 2024 06:10 )             29.1   05-15    140  |  107  |  16  ----------------------------<  124<H>  4.0   |  19<L>  |  0.69    Ca    9.6      15 May 2024 06:10  Phos  3.4     05-15  Mg     1.60     05-15    TPro  8.0  /  Alb  4.0  /  TBili  0.3  /  DBili  x   /  AST  21  /  ALT  29  /  AlkPhos  132<H>  05-15      Above results and case was discussed with Dr. Molina, patient is medically cleared and optimized for discharge home today, patient with no acute complaints, rash/itchiness resolved.  All medications were reviewed with attending, and sent to mutually agreed upon pharmacy> discharge on dexamethasone 8mg qd PO x3days, reglan and zofran prn for short course for N/V, followup with oncologist Dr. Au.  [facility] with [facility] > all medications covered and picked up by family.

## 2024-05-15 NOTE — DISCHARGE NOTE PROVIDER - NSDCCPCAREPLAN_GEN_ALL_CORE_FT
PRINCIPAL DISCHARGE DIAGNOSIS  Diagnosis: Allergy desensitization therapy  Assessment and Plan of Treatment: You were admitted to the hospital for chemotherapy (carboplatin) desensitization.  You had some rash in your arms and face, improved with Zyrtec and Benadryl.    You will continue your follow up with Dr. Au after discharge      SECONDARY DISCHARGE DIAGNOSES  Diagnosis: Chemotherapy induced nausea and vomiting  Assessment and Plan of Treatment: You are being discharged on Dexamethasone 8mg daily x 3 days.You   can take  Zofran and Reglan as needed for worsening nausea at home      Diagnosis: Ovarian cancer  Assessment and Plan of Treatment: Please continue your follow up with Dr. Au on discharge

## 2024-05-15 NOTE — DISCHARGE NOTE PROVIDER - ATTENDING DISCHARGE PHYSICAL EXAMINATION:
Vital Signs Last 24 Hrs  T(C): 36.7 (15 May 2024 12:41), Max: 37.6 (14 May 2024 13:57)  T(F): 98.1 (15 May 2024 12:41), Max: 99.6 (14 May 2024 13:57)  HR: 65 (15 May 2024 12:41) (65 - 116)  BP: 113/72 (15 May 2024 12:41) (113/72 - 138/92)  RR: 16 (15 May 2024 12:41) (16 - 18)  SpO2: 98% (15 May 2024 12:41) (97% - 100%)  Parameters below as of 15 May 2024 12:41  Patient On (Oxygen Delivery Method): room air  Well-appearing woman, laying comfortably in bed, nad  Anicteric sclera; no oral lesions/thrush  RRR, no m/r/g  CTAB, no w/c/r  Abd soft/nd/nt, normoactive bowel sounds  No peripheral edema  CN 2-1 grossly intact; no gross focal neuro deficits

## 2024-05-24 ENCOUNTER — APPOINTMENT (OUTPATIENT)
Dept: HEMATOLOGY ONCOLOGY | Facility: CLINIC | Age: 59
End: 2024-05-24
Payer: MEDICAID

## 2024-05-24 ENCOUNTER — RESULT REVIEW (OUTPATIENT)
Age: 59
End: 2024-05-24

## 2024-05-24 VITALS
DIASTOLIC BLOOD PRESSURE: 77 MMHG | OXYGEN SATURATION: 98 % | SYSTOLIC BLOOD PRESSURE: 107 MMHG | WEIGHT: 176.59 LBS | TEMPERATURE: 97.9 F | BODY MASS INDEX: 29.39 KG/M2 | RESPIRATION RATE: 17 BRPM | HEART RATE: 17 BPM

## 2024-05-24 DIAGNOSIS — C56.9 MALIGNANT NEOPLASM OF UNSPECIFIED OVARY: ICD-10-CM

## 2024-05-24 LAB
ALBUMIN SERPL ELPH-MCNC: 4.1 G/DL
ALP BLD-CCNC: 125 U/L
ALT SERPL-CCNC: 19 U/L
ANION GAP SERPL CALC-SCNC: 14 MMOL/L
AST SERPL-CCNC: 18 U/L
BASOPHILS # BLD AUTO: 0.01 K/UL — SIGNIFICANT CHANGE UP (ref 0–0.2)
BASOPHILS NFR BLD AUTO: 0.2 % — SIGNIFICANT CHANGE UP (ref 0–2)
BILIRUB SERPL-MCNC: 0.2 MG/DL
BUN SERPL-MCNC: 12 MG/DL
CALCIUM SERPL-MCNC: 9.5 MG/DL
CANCER AG125 SERPL-ACNC: 8 U/ML
CHLORIDE SERPL-SCNC: 103 MMOL/L
CO2 SERPL-SCNC: 25 MMOL/L
CREAT SERPL-MCNC: 0.71 MG/DL
EGFR: 98 ML/MIN/1.73M2
EOSINOPHIL # BLD AUTO: 0.01 K/UL — SIGNIFICANT CHANGE UP (ref 0–0.5)
EOSINOPHIL NFR BLD AUTO: 0.2 % — SIGNIFICANT CHANGE UP (ref 0–6)
GLUCOSE SERPL-MCNC: 166 MG/DL
HCT VFR BLD CALC: 31.2 % — LOW (ref 34.5–45)
HGB BLD-MCNC: 10.1 G/DL — LOW (ref 11.5–15.5)
IMM GRANULOCYTES NFR BLD AUTO: 1.8 % — HIGH (ref 0–0.9)
LYMPHOCYTES # BLD AUTO: 0.99 K/UL — LOW (ref 1–3.3)
LYMPHOCYTES # BLD AUTO: 17.9 % — SIGNIFICANT CHANGE UP (ref 13–44)
MAGNESIUM SERPL-MCNC: 1.4 MG/DL
MCHC RBC-ENTMCNC: 31.3 PG — SIGNIFICANT CHANGE UP (ref 27–34)
MCHC RBC-ENTMCNC: 32.4 G/DL — SIGNIFICANT CHANGE UP (ref 32–36)
MCV RBC AUTO: 96.6 FL — SIGNIFICANT CHANGE UP (ref 80–100)
MONOCYTES # BLD AUTO: 0.69 K/UL — SIGNIFICANT CHANGE UP (ref 0–0.9)
MONOCYTES NFR BLD AUTO: 12.5 % — SIGNIFICANT CHANGE UP (ref 2–14)
NEUTROPHILS # BLD AUTO: 3.74 K/UL — SIGNIFICANT CHANGE UP (ref 1.8–7.4)
NEUTROPHILS NFR BLD AUTO: 67.4 % — SIGNIFICANT CHANGE UP (ref 43–77)
NRBC # BLD: 0 /100 WBCS — SIGNIFICANT CHANGE UP (ref 0–0)
PLATELET # BLD AUTO: 248 K/UL — SIGNIFICANT CHANGE UP (ref 150–400)
POTASSIUM SERPL-SCNC: 4.3 MMOL/L
PROT SERPL-MCNC: 7.5 G/DL
RBC # BLD: 3.23 M/UL — LOW (ref 3.8–5.2)
RBC # FLD: 15.9 % — HIGH (ref 10.3–14.5)
SODIUM SERPL-SCNC: 142 MMOL/L
WBC # BLD: 5.54 K/UL — SIGNIFICANT CHANGE UP (ref 3.8–10.5)
WBC # FLD AUTO: 5.54 K/UL — SIGNIFICANT CHANGE UP (ref 3.8–10.5)

## 2024-05-24 PROCEDURE — 99213 OFFICE O/P EST LOW 20 MIN: CPT

## 2024-05-24 NOTE — REVIEW OF SYSTEMS
[Diarrhea: Grade 0] : Diarrhea: Grade 0 [Negative] : Allergic/Immunologic [Fatigue] : fatigue [Constipation] : constipation [FreeTextEntry7] : nausea [FreeTextEntry9] : chronic low back pain - stable [de-identified] : chronic neuropathy of feet - stable

## 2024-05-24 NOTE — HISTORY OF PRESENT ILLNESS
[Disease: _____________________] : Disease: [unfilled] [T: ___] : T[unfilled] [N: ___] : N[unfilled] [M: ___] : M[unfilled] [AJCC Stage: ____] : AJCC Stage: [unfilled] [de-identified] : 56 yo F with a PMH of HTN, fatty liver disease with gallstones, PE (2018), ventral hernia (not repaired), and who presents for management of high grade serous ovarian carcinoma.  She initially had abdominal pain in late 2017 with elevated CA-125, found to have b/l adnexal masses; did not have gross metastatic disease at that time. She had a SUAD-BSO, omentectomy, appendectomy, and tumor debulking 1/29/18, and was found to have high grade serous ovarian carcinoma (TMB 4 Muts/Mb, no targetable mutations, BRCA negative, Ki67 > 90%), PDL1 < 1%, [id number] disease; underwent 6 cycles of Carbo/Taxol 03-06/2018 (done at [facility]) Found to have worsening abdominal and particularly R pelvic LAD on 11/2018. She was then seen by Dr. Maher. Started Doxil/Avastin, 4 cycles 12/2018 - 03/2019 Topotecan/Avastin, 03/2019 - 08/2019, followed by pelvic LN IMRT/IGRT x 5 fx Abraxane 01/2020, followed by 5 fx of RT (06/2020), improved periportal, mesenteric, paracolic, and near resolution of L external iliac LAD, with improved R external iliac LAD. Restarted Abraxane 06/2020. Increase in L external iliac LN 05/2021, agreed to radiate 5 fx every other day and hold off change in therapy, c/b diarrhea. Resumed Abraxane after (c/b neuropathy) Interval progression of disease in November 2021 with R adrenal gland nodule and increasing L external iliac and mesenteric LAD. Started on Niraparib 12/9/21. CT 5/2022 showed an increase in the size of her R adrenal LN and mesenteric LAD with new R retrocaval LAD. S/p adrenal bx 5/25/22 confirming metastatic ovarian carcinoma, Ki67 90%. Foundation showed MSI-S disease, TMB 4 Muts/Mb, ALANA 13.5%, AKT2 amplification, CCNE1 amplification, PIK3CB amplification, TP53 C277*, CIC rearrangement exon 15, MYCL1 amplification, and NFKBIA amplification but no targetable mutations. In 06/2022, she started single agent Gemcitabine on days 1, 8 of 21 day cycle. CT 09/2022 showed overall POD with enlarging R adrenal nodule and RP LAD with slightly decreased mesenteric LAD. She was planned to start Alimta, but referred to Dr. Au to evaluate for possible clinical trial.  She lost about 15 pounds since the Gemcitabine treatment but has gained 4 of it back. Prior to Gemcitabine, her weight was stable. Currently c/o constipation, no blood in stool. No N/V, CP, cough. She thinks she had an allergic reaction (dyspnea, flushing) to her 3rd chemotherapy, but was improved after infusing slowly. No reaction to Carbo/Taxol. She had myalgias constantly since starting chemotherapy, but improved with Tylenol. Also has a moderate headache (without blurry vision) that improves  She had a PE when originally diagnosed in 2018, as well as in 2022 in the pelvis.  Family Hx of prostate cancer (father, age 64) and maternal aunt (breast cancer, young age). No smoking, alcohol, or drug use history. Lives with her  and adult children. Able to function but usually has to stop housework after about 20-30 minutes, takes 5 minute break, and then restart. She does not go outside much because she has pain from her ventral hernia (she has not had it repaired due to cancer).  Since her last visit with us in 2022 she received Alimta starting 9/30/22. Found to have POD in December 2022. She was started on Letrozole, FOLR1 testing was done concurrently and found to be negative. She also recieved palliative RT to adrenal met in February 2023. She was found to have POD in April 2023 and treatment was changed to taxol and Avastin starting 5/4/23.  She was most recently started on treatment with retreat Carboplatin in January 2024 but had reaction about 10 min into 2nd cycle on 2/14/24 (treated outpatient, did not need hospital transfer) She is here today to discuss possible treatment with Carboplatin via desensitization. [de-identified] : High Grade Serous Ovarian Carcinoma [de-identified] : [place] Carboplatin( weekly AUC-2)- 1/12/24- 1/26/24- HSR on week 2 Paclitaxel/ Anastasiia- 5/4/2023- 12/29/23- 9 cycles Letrozole- 1/6/2023- 4/28/2023 Alimta 9/30/22- 1/6/2023- 4 cycles Gemcitabine - 6/9/2022- 9/15/2022 - four cycles Niraparib- 12/9/21- 4/28/22 Abraxane 1/23/2020- 12/3/2021- six cycles Topotecan/ Avastin- 4/2/19- 7/30/19- four cycles Doxil/ Avastin- 12/4/18- 3/28/19- four cycles. [FreeTextEntry1] : Carboplatin desensitization s/p 3 cycles [de-identified] : Patient is here for follow up after third Carboplatin desensitization at [facility]. Overall she is tolerating treatment. She has a few days of nausea following treatment, controlled well with anti-emetics. Constipation is controlled with Senna. Appetite is decreased following chemo but is much better now. Chronic neuropathy and low back pain are stable.

## 2024-05-28 ENCOUNTER — RESULT REVIEW (OUTPATIENT)
Age: 59
End: 2024-05-28

## 2024-06-14 ENCOUNTER — OUTPATIENT (OUTPATIENT)
Dept: OUTPATIENT SERVICES | Facility: HOSPITAL | Age: 59
LOS: 1 days | End: 2024-06-14
Payer: MEDICAID

## 2024-06-14 ENCOUNTER — APPOINTMENT (OUTPATIENT)
Dept: CT IMAGING | Facility: IMAGING CENTER | Age: 59
End: 2024-06-14
Payer: MEDICAID

## 2024-06-14 DIAGNOSIS — C56.9 MALIGNANT NEOPLASM OF UNSPECIFIED OVARY: ICD-10-CM

## 2024-06-14 PROCEDURE — 70491 CT SOFT TISSUE NECK W/DYE: CPT | Mod: 26

## 2024-06-14 PROCEDURE — 71260 CT THORAX DX C+: CPT | Mod: 26

## 2024-06-14 PROCEDURE — 74177 CT ABD & PELVIS W/CONTRAST: CPT | Mod: 26

## 2024-06-14 PROCEDURE — 71260 CT THORAX DX C+: CPT

## 2024-06-14 PROCEDURE — 70491 CT SOFT TISSUE NECK W/DYE: CPT

## 2024-06-14 PROCEDURE — 74177 CT ABD & PELVIS W/CONTRAST: CPT

## 2024-07-17 ENCOUNTER — OUTPATIENT (OUTPATIENT)
Dept: OUTPATIENT SERVICES | Facility: HOSPITAL | Age: 59
LOS: 1 days | Discharge: HOME | End: 2024-07-17

## 2024-07-22 DIAGNOSIS — C56.9 MALIGNANT NEOPLASM OF UNSPECIFIED OVARY: ICD-10-CM

## 2024-08-02 ENCOUNTER — APPOINTMENT (OUTPATIENT)
Dept: MRI IMAGING | Facility: CLINIC | Age: 59
End: 2024-08-02

## 2024-08-19 ENCOUNTER — APPOINTMENT (OUTPATIENT)
Dept: MRI IMAGING | Facility: CLINIC | Age: 59
End: 2024-08-19
Payer: MEDICAID

## 2024-08-19 PROCEDURE — A9585: CPT

## 2024-08-19 PROCEDURE — 70553 MRI BRAIN STEM W/O & W/DYE: CPT

## 2024-08-30 ENCOUNTER — TRANSCRIPTION ENCOUNTER (OUTPATIENT)
Age: 59
End: 2024-08-30

## 2024-09-03 ENCOUNTER — TRANSCRIPTION ENCOUNTER (OUTPATIENT)
Age: 59
End: 2024-09-03

## 2024-09-12 ENCOUNTER — OUTPATIENT (OUTPATIENT)
Dept: OUTPATIENT SERVICES | Facility: HOSPITAL | Age: 59
LOS: 1 days | Discharge: HOME | End: 2024-09-12

## 2024-09-19 DIAGNOSIS — C56.9 MALIGNANT NEOPLASM OF UNSPECIFIED OVARY: ICD-10-CM

## 2024-10-11 NOTE — DISCHARGE NOTE NURSING/CASE MANAGEMENT/SOCIAL WORK - PATIENT PORTAL LINK FT
You can access the FollowMyHealth Patient Portal offered by [facility] by registering at the following website: [email or web address]. By joining [facility]’s FollowMyHealth portal, you will also be able to view your health information using other applications (apps) compatible with our system.
No

## 2024-11-21 ENCOUNTER — APPOINTMENT (OUTPATIENT)
Dept: CT IMAGING | Facility: CLINIC | Age: 59
End: 2024-11-21
Payer: MEDICAID

## 2024-11-21 PROCEDURE — 74177 CT ABD & PELVIS W/CONTRAST: CPT

## 2024-11-21 PROCEDURE — 71260 CT THORAX DX C+: CPT

## 2025-01-15 ENCOUNTER — OUTPATIENT (OUTPATIENT)
Dept: OUTPATIENT SERVICES | Facility: HOSPITAL | Age: 60
LOS: 1 days | Discharge: HOME | End: 2025-01-15
Payer: MEDICAID

## 2025-01-15 DIAGNOSIS — C56.9 MALIGNANT NEOPLASM OF UNSPECIFIED OVARY: ICD-10-CM

## 2025-01-16 ENCOUNTER — APPOINTMENT (OUTPATIENT)
Dept: HEMATOLOGY ONCOLOGY | Facility: CLINIC | Age: 60
End: 2025-01-16
Payer: MEDICAID

## 2025-01-16 ENCOUNTER — NON-APPOINTMENT (OUTPATIENT)
Age: 60
End: 2025-01-16

## 2025-01-16 VITALS
WEIGHT: 187.39 LBS | HEART RATE: 100 BPM | DIASTOLIC BLOOD PRESSURE: 86 MMHG | TEMPERATURE: 98 F | RESPIRATION RATE: 16 BRPM | OXYGEN SATURATION: 97 % | BODY MASS INDEX: 31.18 KG/M2 | SYSTOLIC BLOOD PRESSURE: 137 MMHG

## 2025-01-16 DIAGNOSIS — C56.9 MALIGNANT NEOPLASM OF UNSPECIFIED OVARY: ICD-10-CM

## 2025-01-16 PROCEDURE — 99215 OFFICE O/P EST HI 40 MIN: CPT

## 2025-02-06 ENCOUNTER — NON-APPOINTMENT (OUTPATIENT)
Age: 60
End: 2025-02-06

## 2025-02-06 ENCOUNTER — RESULT REVIEW (OUTPATIENT)
Age: 60
End: 2025-02-06

## 2025-02-06 ENCOUNTER — APPOINTMENT (OUTPATIENT)
Dept: HEMATOLOGY ONCOLOGY | Facility: CLINIC | Age: 60
End: 2025-02-06
Payer: MEDICAID

## 2025-02-06 VITALS
TEMPERATURE: 97.5 F | SYSTOLIC BLOOD PRESSURE: 139 MMHG | OXYGEN SATURATION: 97 % | HEIGHT: 65.16 IN | DIASTOLIC BLOOD PRESSURE: 81 MMHG | HEART RATE: 90 BPM | BODY MASS INDEX: 30.12 KG/M2 | WEIGHT: 182.98 LBS | RESPIRATION RATE: 17 BRPM

## 2025-02-06 DIAGNOSIS — C56.9 MALIGNANT NEOPLASM OF UNSPECIFIED OVARY: ICD-10-CM

## 2025-02-06 LAB
BASOPHILS # BLD AUTO: 0.02 K/UL — SIGNIFICANT CHANGE UP (ref 0–0.2)
BASOPHILS NFR BLD AUTO: 0.1 % — SIGNIFICANT CHANGE UP (ref 0–2)
EOSINOPHIL # BLD AUTO: 0.04 K/UL — SIGNIFICANT CHANGE UP (ref 0–0.5)
EOSINOPHIL NFR BLD AUTO: 0.3 % — SIGNIFICANT CHANGE UP (ref 0–6)
HCT VFR BLD CALC: 32.1 % — LOW (ref 34.5–45)
HGB BLD-MCNC: 10.3 G/DL — LOW (ref 11.5–15.5)
IMM GRANULOCYTES NFR BLD AUTO: 2.2 % — HIGH (ref 0–0.9)
LYMPHOCYTES # BLD AUTO: 1.37 K/UL — SIGNIFICANT CHANGE UP (ref 1–3.3)
LYMPHOCYTES # BLD AUTO: 9.5 % — LOW (ref 13–44)
MCHC RBC-ENTMCNC: 29.8 PG — SIGNIFICANT CHANGE UP (ref 27–34)
MCHC RBC-ENTMCNC: 32.1 G/DL — SIGNIFICANT CHANGE UP (ref 32–36)
MCV RBC AUTO: 92.8 FL — SIGNIFICANT CHANGE UP (ref 80–100)
MONOCYTES # BLD AUTO: 0.84 K/UL — SIGNIFICANT CHANGE UP (ref 0–0.9)
MONOCYTES NFR BLD AUTO: 5.8 % — SIGNIFICANT CHANGE UP (ref 2–14)
NEUTROPHILS # BLD AUTO: 11.77 K/UL — HIGH (ref 1.8–7.4)
NEUTROPHILS NFR BLD AUTO: 82.1 % — HIGH (ref 43–77)
NRBC # BLD: 0 /100 WBCS — SIGNIFICANT CHANGE UP (ref 0–0)
NRBC BLD-RTO: 0 /100 WBCS — SIGNIFICANT CHANGE UP (ref 0–0)
PLATELET # BLD AUTO: 385 K/UL — SIGNIFICANT CHANGE UP (ref 150–400)
RBC # BLD: 3.46 M/UL — LOW (ref 3.8–5.2)
RBC # FLD: 13.8 % — SIGNIFICANT CHANGE UP (ref 10.3–14.5)
RETICS #: 67.1 K/UL — SIGNIFICANT CHANGE UP (ref 25–125)
RETICS/RBC NFR: 1.9 % — SIGNIFICANT CHANGE UP (ref 0.5–2.5)
WBC # BLD: 14.36 K/UL — HIGH (ref 3.8–10.5)
WBC # FLD AUTO: 14.36 K/UL — HIGH (ref 3.8–10.5)

## 2025-02-06 PROCEDURE — 99214 OFFICE O/P EST MOD 30 MIN: CPT

## 2025-02-06 PROCEDURE — 93010 ELECTROCARDIOGRAM REPORT: CPT

## 2025-02-07 LAB
ALBUMIN SERPL ELPH-MCNC: 3.8 G/DL
ALP BLD-CCNC: 99 U/L
ALT SERPL-CCNC: 15 U/L
ANION GAP SERPL CALC-SCNC: 14 MMOL/L
AST SERPL-CCNC: 10 U/L
BILIRUB SERPL-MCNC: 0.3 MG/DL
BUN SERPL-MCNC: 18 MG/DL
CALCIUM SERPL-MCNC: 9.5 MG/DL
CANCER AG125 SERPL-ACNC: 77 U/ML
CHLORIDE SERPL-SCNC: 104 MMOL/L
CO2 SERPL-SCNC: 26 MMOL/L
CREAT SERPL-MCNC: 0.71 MG/DL
EGFR: 98 ML/MIN/1.73M2
GLUCOSE SERPL-MCNC: 98 MG/DL
POTASSIUM SERPL-SCNC: 4.1 MMOL/L
PROT SERPL-MCNC: 7.5 G/DL
SODIUM SERPL-SCNC: 143 MMOL/L

## 2025-02-21 ENCOUNTER — OUTPATIENT (OUTPATIENT)
Dept: OUTPATIENT SERVICES | Facility: HOSPITAL | Age: 60
LOS: 1 days | End: 2025-02-21

## 2025-02-21 ENCOUNTER — NON-APPOINTMENT (OUTPATIENT)
Age: 60
End: 2025-02-21

## 2025-02-21 DIAGNOSIS — C56.9 MALIGNANT NEOPLASM OF UNSPECIFIED OVARY: ICD-10-CM

## 2025-02-21 DIAGNOSIS — Z00.8 ENCOUNTER FOR OTHER GENERAL EXAMINATION: ICD-10-CM

## 2025-02-26 ENCOUNTER — APPOINTMENT (OUTPATIENT)
Dept: CT IMAGING | Facility: IMAGING CENTER | Age: 60
End: 2025-02-26
Payer: MEDICAID

## 2025-02-26 ENCOUNTER — OUTPATIENT (OUTPATIENT)
Dept: OUTPATIENT SERVICES | Facility: HOSPITAL | Age: 60
LOS: 1 days | End: 2025-02-26
Payer: MEDICAID

## 2025-02-26 DIAGNOSIS — C56.9 MALIGNANT NEOPLASM OF UNSPECIFIED OVARY: ICD-10-CM

## 2025-02-26 PROCEDURE — 74177 CT ABD & PELVIS W/CONTRAST: CPT

## 2025-02-26 PROCEDURE — 71260 CT THORAX DX C+: CPT | Mod: 26

## 2025-02-26 PROCEDURE — 71260 CT THORAX DX C+: CPT

## 2025-02-26 PROCEDURE — 74177 CT ABD & PELVIS W/CONTRAST: CPT | Mod: 26

## 2025-02-27 ENCOUNTER — NON-APPOINTMENT (OUTPATIENT)
Age: 60
End: 2025-02-27

## 2025-03-04 ENCOUNTER — APPOINTMENT (OUTPATIENT)
Dept: CARDIOLOGY | Facility: CLINIC | Age: 60
End: 2025-03-04
Payer: MEDICAID

## 2025-03-04 PROCEDURE — 93306 TTE W/DOPPLER COMPLETE: CPT

## 2025-03-04 PROCEDURE — 93356 MYOCRD STRAIN IMG SPCKL TRCK: CPT

## 2025-03-07 ENCOUNTER — LABORATORY RESULT (OUTPATIENT)
Age: 60
End: 2025-03-07

## 2025-03-07 ENCOUNTER — APPOINTMENT (OUTPATIENT)
Dept: HEMATOLOGY ONCOLOGY | Facility: CLINIC | Age: 60
End: 2025-03-07
Payer: MEDICAID

## 2025-03-07 ENCOUNTER — NON-APPOINTMENT (OUTPATIENT)
Age: 60
End: 2025-03-07

## 2025-03-07 VITALS
BODY MASS INDEX: 29.32 KG/M2 | SYSTOLIC BLOOD PRESSURE: 93 MMHG | OXYGEN SATURATION: 97 % | HEART RATE: 130 BPM | TEMPERATURE: 97 F | DIASTOLIC BLOOD PRESSURE: 70 MMHG | RESPIRATION RATE: 17 BRPM | WEIGHT: 176 LBS | HEIGHT: 65 IN

## 2025-03-07 DIAGNOSIS — C56.9 MALIGNANT NEOPLASM OF UNSPECIFIED OVARY: ICD-10-CM

## 2025-03-07 PROCEDURE — 93010 ELECTROCARDIOGRAM REPORT: CPT

## 2025-03-07 PROCEDURE — 99214 OFFICE O/P EST MOD 30 MIN: CPT

## 2025-03-10 ENCOUNTER — RESULT REVIEW (OUTPATIENT)
Age: 60
End: 2025-03-10

## 2025-03-10 ENCOUNTER — APPOINTMENT (OUTPATIENT)
Dept: HEMATOLOGY ONCOLOGY | Facility: CLINIC | Age: 60
End: 2025-03-10

## 2025-03-10 LAB
BASOPHILS # BLD AUTO: 0.03 K/UL — SIGNIFICANT CHANGE UP (ref 0–0.2)
BASOPHILS NFR BLD AUTO: 0.2 % — SIGNIFICANT CHANGE UP (ref 0–2)
EOSINOPHIL # BLD AUTO: 0.02 K/UL — SIGNIFICANT CHANGE UP (ref 0–0.5)
EOSINOPHIL NFR BLD AUTO: 0.2 % — SIGNIFICANT CHANGE UP (ref 0–6)
HCT VFR BLD CALC: 30.9 % — LOW (ref 34.5–45)
HGB BLD-MCNC: 9.8 G/DL — LOW (ref 11.5–15.5)
IMM GRANULOCYTES NFR BLD AUTO: 1.7 % — HIGH (ref 0–0.9)
LYMPHOCYTES # BLD AUTO: 0.73 K/UL — LOW (ref 1–3.3)
LYMPHOCYTES # BLD AUTO: 6 % — LOW (ref 13–44)
MCHC RBC-ENTMCNC: 28.6 PG — SIGNIFICANT CHANGE UP (ref 27–34)
MCHC RBC-ENTMCNC: 31.7 G/DL — LOW (ref 32–36)
MCV RBC AUTO: 90.1 FL — SIGNIFICANT CHANGE UP (ref 80–100)
MONOCYTES # BLD AUTO: 1.08 K/UL — HIGH (ref 0–0.9)
MONOCYTES NFR BLD AUTO: 8.9 % — SIGNIFICANT CHANGE UP (ref 2–14)
NEUTROPHILS # BLD AUTO: 10.06 K/UL — HIGH (ref 1.8–7.4)
NEUTROPHILS NFR BLD AUTO: 83 % — HIGH (ref 43–77)
NRBC BLD AUTO-RTO: 0 /100 WBCS — SIGNIFICANT CHANGE UP (ref 0–0)
PLATELET # BLD AUTO: 239 K/UL — SIGNIFICANT CHANGE UP (ref 150–400)
RBC # BLD: 3.43 M/UL — LOW (ref 3.8–5.2)
RBC # FLD: 14.2 % — SIGNIFICANT CHANGE UP (ref 10.3–14.5)
WBC # BLD: 12.13 K/UL — HIGH (ref 3.8–10.5)
WBC # FLD AUTO: 12.13 K/UL — HIGH (ref 3.8–10.5)

## 2025-03-11 ENCOUNTER — APPOINTMENT (OUTPATIENT)
Dept: INFUSION THERAPY | Facility: HOSPITAL | Age: 60
End: 2025-03-11

## 2025-03-11 ENCOUNTER — APPOINTMENT (OUTPATIENT)
Dept: HEMATOLOGY ONCOLOGY | Facility: CLINIC | Age: 60
End: 2025-03-11

## 2025-03-12 ENCOUNTER — APPOINTMENT (OUTPATIENT)
Dept: HEMATOLOGY ONCOLOGY | Facility: CLINIC | Age: 60
End: 2025-03-12

## 2025-03-12 ENCOUNTER — RESULT REVIEW (OUTPATIENT)
Age: 60
End: 2025-03-12

## 2025-03-12 LAB
BASOPHILS # BLD AUTO: 0.03 K/UL — SIGNIFICANT CHANGE UP (ref 0–0.2)
BASOPHILS NFR BLD AUTO: 0.3 % — SIGNIFICANT CHANGE UP (ref 0–2)
EOSINOPHIL # BLD AUTO: 0.03 K/UL — SIGNIFICANT CHANGE UP (ref 0–0.5)
EOSINOPHIL NFR BLD AUTO: 0.3 % — SIGNIFICANT CHANGE UP (ref 0–6)
HCT VFR BLD CALC: 30.3 % — LOW (ref 34.5–45)
HGB BLD-MCNC: 9.8 G/DL — LOW (ref 11.5–15.5)
IMM GRANULOCYTES NFR BLD AUTO: 2.7 % — HIGH (ref 0–0.9)
LYMPHOCYTES # BLD AUTO: 1.25 K/UL — SIGNIFICANT CHANGE UP (ref 1–3.3)
LYMPHOCYTES # BLD AUTO: 10.5 % — LOW (ref 13–44)
MCHC RBC-ENTMCNC: 29 PG — SIGNIFICANT CHANGE UP (ref 27–34)
MCHC RBC-ENTMCNC: 32.3 G/DL — SIGNIFICANT CHANGE UP (ref 32–36)
MCV RBC AUTO: 89.6 FL — SIGNIFICANT CHANGE UP (ref 80–100)
MONOCYTES # BLD AUTO: 1.33 K/UL — HIGH (ref 0–0.9)
MONOCYTES NFR BLD AUTO: 11.2 % — SIGNIFICANT CHANGE UP (ref 2–14)
NEUTROPHILS # BLD AUTO: 8.96 K/UL — HIGH (ref 1.8–7.4)
NEUTROPHILS NFR BLD AUTO: 75 % — SIGNIFICANT CHANGE UP (ref 43–77)
NRBC BLD AUTO-RTO: 0 /100 WBCS — SIGNIFICANT CHANGE UP (ref 0–0)
PLATELET # BLD AUTO: 250 K/UL — SIGNIFICANT CHANGE UP (ref 150–400)
RBC # BLD: 3.38 M/UL — LOW (ref 3.8–5.2)
RBC # FLD: 14.4 % — SIGNIFICANT CHANGE UP (ref 10.3–14.5)
WBC # BLD: 11.92 K/UL — HIGH (ref 3.8–10.5)
WBC # FLD AUTO: 11.92 K/UL — HIGH (ref 3.8–10.5)

## 2025-03-14 ENCOUNTER — APPOINTMENT (OUTPATIENT)
Dept: HEMATOLOGY ONCOLOGY | Facility: CLINIC | Age: 60
End: 2025-03-14

## 2025-03-14 ENCOUNTER — RESULT REVIEW (OUTPATIENT)
Age: 60
End: 2025-03-14

## 2025-03-14 ENCOUNTER — NON-APPOINTMENT (OUTPATIENT)
Age: 60
End: 2025-03-14

## 2025-03-14 LAB
BASOPHILS # BLD AUTO: 0.03 K/UL — SIGNIFICANT CHANGE UP (ref 0–0.2)
BASOPHILS NFR BLD AUTO: 0.2 % — SIGNIFICANT CHANGE UP (ref 0–2)
EOSINOPHIL # BLD AUTO: 0.03 K/UL — SIGNIFICANT CHANGE UP (ref 0–0.5)
EOSINOPHIL NFR BLD AUTO: 0.2 % — SIGNIFICANT CHANGE UP (ref 0–6)
HCT VFR BLD CALC: 29.7 % — LOW (ref 34.5–45)
HGB BLD-MCNC: 9.5 G/DL — LOW (ref 11.5–15.5)
IMM GRANULOCYTES NFR BLD AUTO: 1.9 % — HIGH (ref 0–0.9)
LYMPHOCYTES # BLD AUTO: 1.34 K/UL — SIGNIFICANT CHANGE UP (ref 1–3.3)
LYMPHOCYTES # BLD AUTO: 7.8 % — LOW (ref 13–44)
MCHC RBC-ENTMCNC: 28.3 PG — SIGNIFICANT CHANGE UP (ref 27–34)
MCHC RBC-ENTMCNC: 32 G/DL — SIGNIFICANT CHANGE UP (ref 32–36)
MCV RBC AUTO: 88.4 FL — SIGNIFICANT CHANGE UP (ref 80–100)
MONOCYTES # BLD AUTO: 1.45 K/UL — HIGH (ref 0–0.9)
MONOCYTES NFR BLD AUTO: 8.4 % — SIGNIFICANT CHANGE UP (ref 2–14)
NEUTROPHILS # BLD AUTO: 14.04 K/UL — HIGH (ref 1.8–7.4)
NEUTROPHILS NFR BLD AUTO: 81.5 % — HIGH (ref 43–77)
NRBC BLD AUTO-RTO: 0 /100 WBCS — SIGNIFICANT CHANGE UP (ref 0–0)
PLATELET # BLD AUTO: 197 K/UL — SIGNIFICANT CHANGE UP (ref 150–400)
RBC # BLD: 3.36 M/UL — LOW (ref 3.8–5.2)
RBC # FLD: 14.3 % — SIGNIFICANT CHANGE UP (ref 10.3–14.5)
WBC # BLD: 17.22 K/UL — HIGH (ref 3.8–10.5)
WBC # FLD AUTO: 17.22 K/UL — HIGH (ref 3.8–10.5)

## 2025-03-17 ENCOUNTER — OUTPATIENT (OUTPATIENT)
Dept: OUTPATIENT SERVICES | Facility: HOSPITAL | Age: 60
LOS: 1 days | Discharge: HOME | End: 2025-03-17

## 2025-03-17 DIAGNOSIS — C56.9 MALIGNANT NEOPLASM OF UNSPECIFIED OVARY: ICD-10-CM

## 2025-03-18 ENCOUNTER — APPOINTMENT (OUTPATIENT)
Dept: HEMATOLOGY ONCOLOGY | Facility: CLINIC | Age: 60
End: 2025-03-18

## 2025-03-18 ENCOUNTER — NON-APPOINTMENT (OUTPATIENT)
Age: 60
End: 2025-03-18

## 2025-03-18 ENCOUNTER — RESULT REVIEW (OUTPATIENT)
Age: 60
End: 2025-03-18

## 2025-03-18 LAB
BASOPHILS # BLD AUTO: 0.04 K/UL — SIGNIFICANT CHANGE UP (ref 0–0.2)
BASOPHILS NFR BLD AUTO: 0.3 % — SIGNIFICANT CHANGE UP (ref 0–2)
EOSINOPHIL # BLD AUTO: 0.05 K/UL — SIGNIFICANT CHANGE UP (ref 0–0.5)
EOSINOPHIL NFR BLD AUTO: 0.3 % — SIGNIFICANT CHANGE UP (ref 0–6)
HCT VFR BLD CALC: 28.7 % — LOW (ref 34.5–45)
HGB BLD-MCNC: 9.2 G/DL — LOW (ref 11.5–15.5)
IMM GRANULOCYTES NFR BLD AUTO: 2.3 % — HIGH (ref 0–0.9)
LYMPHOCYTES # BLD AUTO: 1.43 K/UL — SIGNIFICANT CHANGE UP (ref 1–3.3)
LYMPHOCYTES # BLD AUTO: 9.1 % — LOW (ref 13–44)
MCHC RBC-ENTMCNC: 28.2 PG — SIGNIFICANT CHANGE UP (ref 27–34)
MCHC RBC-ENTMCNC: 32.1 G/DL — SIGNIFICANT CHANGE UP (ref 32–36)
MCV RBC AUTO: 88 FL — SIGNIFICANT CHANGE UP (ref 80–100)
MONOCYTES # BLD AUTO: 1.57 K/UL — HIGH (ref 0–0.9)
MONOCYTES NFR BLD AUTO: 10 % — SIGNIFICANT CHANGE UP (ref 2–14)
NEUTROPHILS # BLD AUTO: 12.32 K/UL — HIGH (ref 1.8–7.4)
NEUTROPHILS NFR BLD AUTO: 78 % — HIGH (ref 43–77)
NRBC BLD AUTO-RTO: 0 /100 WBCS — SIGNIFICANT CHANGE UP (ref 0–0)
PLATELET # BLD AUTO: 376 K/UL — SIGNIFICANT CHANGE UP (ref 150–400)
RBC # BLD: 3.26 M/UL — LOW (ref 3.8–5.2)
RBC # FLD: 14.4 % — SIGNIFICANT CHANGE UP (ref 10.3–14.5)
WBC # BLD: 15.77 K/UL — HIGH (ref 3.8–10.5)
WBC # FLD AUTO: 15.77 K/UL — HIGH (ref 3.8–10.5)

## 2025-04-03 NOTE — ED PROVIDER NOTE - CROS ED ROS STATEMENT
[facility] regarding [facility] and chemo treatment. Made Carisa aware that pt was receiving his chemo treatment at home when the needle was dislodged from port. Carisa states that she will come to get the medication from ER and that We could access the port to heparin lock it.    all other ROS negative except as per HPI

## 2025-05-16 ENCOUNTER — APPOINTMENT (OUTPATIENT)
Dept: CT IMAGING | Facility: CLINIC | Age: 60
End: 2025-05-16

## 2025-05-16 PROCEDURE — 71260 CT THORAX DX C+: CPT

## 2025-05-16 PROCEDURE — 74177 CT ABD & PELVIS W/CONTRAST: CPT

## 2025-07-09 ENCOUNTER — APPOINTMENT (OUTPATIENT)
Dept: DERMATOLOGY | Facility: CLINIC | Age: 60
End: 2025-07-09
Payer: MEDICAID

## 2025-07-09 PROBLEM — D48.5 NEOPLASM OF UNCERTAIN BEHAVIOR OF SKIN: Status: ACTIVE | Noted: 2025-07-09

## 2025-07-09 PROCEDURE — 11104 PUNCH BX SKIN SINGLE LESION: CPT

## 2025-07-09 PROCEDURE — 11105 PUNCH BX SKIN EA SEP/ADDL: CPT

## 2025-07-09 PROCEDURE — 99204 OFFICE O/P NEW MOD 45 MIN: CPT | Mod: 25

## 2025-07-09 RX ORDER — TRIAMCINOLONE ACETONIDE 1 MG/G
0.1 OINTMENT TOPICAL
Qty: 1 | Refills: 1 | Status: ACTIVE | COMMUNITY
Start: 2025-07-09 | End: 1900-01-01

## 2025-07-09 RX ORDER — CLOBETASOL PROPIONATE 0.5 MG/G
0.05 OINTMENT TOPICAL TWICE DAILY
Qty: 1 | Refills: 0 | Status: ACTIVE | COMMUNITY
Start: 2025-07-09 | End: 1900-01-01

## 2025-07-11 LAB — DERMATOLOGY BIOPSY: NORMAL

## 2025-07-17 ENCOUNTER — APPOINTMENT (OUTPATIENT)
Dept: DERMATOLOGY | Facility: CLINIC | Age: 60
End: 2025-07-17
Payer: MEDICAID

## 2025-07-17 PROCEDURE — G2211 COMPLEX E/M VISIT ADD ON: CPT | Mod: NC

## 2025-07-17 PROCEDURE — 99215 OFFICE O/P EST HI 40 MIN: CPT

## 2025-07-18 PROBLEM — L30.9 DERMATITIS: Status: ACTIVE | Noted: 2025-07-09

## 2025-08-11 ENCOUNTER — APPOINTMENT (OUTPATIENT)
Dept: CT IMAGING | Facility: CLINIC | Age: 60
End: 2025-08-11
Payer: MEDICAID

## 2025-08-11 PROCEDURE — 71260 CT THORAX DX C+: CPT

## 2025-08-11 PROCEDURE — 74177 CT ABD & PELVIS W/CONTRAST: CPT

## 2025-08-14 ENCOUNTER — APPOINTMENT (OUTPATIENT)
Dept: ULTRASOUND IMAGING | Facility: CLINIC | Age: 60
End: 2025-08-14
Payer: MEDICAID

## 2025-08-14 PROCEDURE — 93970 EXTREMITY STUDY: CPT
